# Patient Record
Sex: MALE | Race: WHITE | NOT HISPANIC OR LATINO
[De-identification: names, ages, dates, MRNs, and addresses within clinical notes are randomized per-mention and may not be internally consistent; named-entity substitution may affect disease eponyms.]

---

## 2019-02-22 ENCOUNTER — TRANSCRIPTION ENCOUNTER (OUTPATIENT)
Age: 84
End: 2019-02-22

## 2019-02-22 ENCOUNTER — INPATIENT (INPATIENT)
Facility: HOSPITAL | Age: 84
LOS: 12 days | Discharge: NURSING, REHAB OR LONG-TERM CARE | DRG: 480 | End: 2019-03-07
Attending: HOSPITALIST | Admitting: HOSPITALIST
Payer: MEDICARE

## 2019-02-22 VITALS
OXYGEN SATURATION: 93 % | TEMPERATURE: 98 F | HEART RATE: 90 BPM | SYSTOLIC BLOOD PRESSURE: 163 MMHG | RESPIRATION RATE: 16 BRPM | WEIGHT: 149.91 LBS | DIASTOLIC BLOOD PRESSURE: 68 MMHG | HEIGHT: 69 IN

## 2019-02-22 DIAGNOSIS — S72.001A FRACTURE OF UNSPECIFIED PART OF NECK OF RIGHT FEMUR, INITIAL ENCOUNTER FOR CLOSED FRACTURE: ICD-10-CM

## 2019-02-22 DIAGNOSIS — Z98.890 OTHER SPECIFIED POSTPROCEDURAL STATES: Chronic | ICD-10-CM

## 2019-02-22 DIAGNOSIS — Z29.9 ENCOUNTER FOR PROPHYLACTIC MEASURES, UNSPECIFIED: ICD-10-CM

## 2019-02-22 DIAGNOSIS — R94.31 ABNORMAL ELECTROCARDIOGRAM [ECG] [EKG]: ICD-10-CM

## 2019-02-22 DIAGNOSIS — I10 ESSENTIAL (PRIMARY) HYPERTENSION: ICD-10-CM

## 2019-02-22 DIAGNOSIS — E78.5 HYPERLIPIDEMIA, UNSPECIFIED: ICD-10-CM

## 2019-02-22 LAB
ALBUMIN SERPL ELPH-MCNC: 3.2 G/DL — LOW (ref 3.3–5)
ALP SERPL-CCNC: 103 U/L — SIGNIFICANT CHANGE UP (ref 30–120)
ALT FLD-CCNC: 26 U/L DA — SIGNIFICANT CHANGE UP (ref 10–60)
ANION GAP SERPL CALC-SCNC: 11 MMOL/L — SIGNIFICANT CHANGE UP (ref 5–17)
APTT BLD: 33.2 SEC — SIGNIFICANT CHANGE UP (ref 28.5–37)
AST SERPL-CCNC: 23 U/L — SIGNIFICANT CHANGE UP (ref 10–40)
BASOPHILS # BLD AUTO: 0.07 K/UL — SIGNIFICANT CHANGE UP (ref 0–0.2)
BASOPHILS NFR BLD AUTO: 0.6 % — SIGNIFICANT CHANGE UP (ref 0–2)
BILIRUB SERPL-MCNC: 0.6 MG/DL — SIGNIFICANT CHANGE UP (ref 0.2–1.2)
BUN SERPL-MCNC: 28 MG/DL — HIGH (ref 7–23)
CALCIUM SERPL-MCNC: 8.8 MG/DL — SIGNIFICANT CHANGE UP (ref 8.4–10.5)
CHLORIDE SERPL-SCNC: 102 MMOL/L — SIGNIFICANT CHANGE UP (ref 96–108)
CO2 SERPL-SCNC: 27 MMOL/L — SIGNIFICANT CHANGE UP (ref 22–31)
CREAT SERPL-MCNC: 0.91 MG/DL — SIGNIFICANT CHANGE UP (ref 0.5–1.3)
EOSINOPHIL # BLD AUTO: 0.05 K/UL — SIGNIFICANT CHANGE UP (ref 0–0.5)
EOSINOPHIL NFR BLD AUTO: 0.4 % — SIGNIFICANT CHANGE UP (ref 0–6)
GLUCOSE SERPL-MCNC: 138 MG/DL — HIGH (ref 70–99)
HCT VFR BLD CALC: 38.6 % — LOW (ref 39–50)
HGB BLD-MCNC: 11.8 G/DL — LOW (ref 13–17)
IMM GRANULOCYTES NFR BLD AUTO: 0.4 % — SIGNIFICANT CHANGE UP (ref 0–1.5)
INR BLD: 1.09 RATIO — SIGNIFICANT CHANGE UP (ref 0.88–1.16)
LYMPHOCYTES # BLD AUTO: 1.25 K/UL — SIGNIFICANT CHANGE UP (ref 1–3.3)
LYMPHOCYTES # BLD AUTO: 11.2 % — LOW (ref 13–44)
MCHC RBC-ENTMCNC: 26.8 PG — LOW (ref 27–34)
MCHC RBC-ENTMCNC: 30.6 GM/DL — LOW (ref 32–36)
MCV RBC AUTO: 87.7 FL — SIGNIFICANT CHANGE UP (ref 80–100)
MONOCYTES # BLD AUTO: 0.72 K/UL — SIGNIFICANT CHANGE UP (ref 0–0.9)
MONOCYTES NFR BLD AUTO: 6.4 % — SIGNIFICANT CHANGE UP (ref 2–14)
NEUTROPHILS # BLD AUTO: 9.04 K/UL — HIGH (ref 1.8–7.4)
NEUTROPHILS NFR BLD AUTO: 81 % — HIGH (ref 43–77)
NRBC # BLD: 0 /100 WBCS — SIGNIFICANT CHANGE UP (ref 0–0)
PLATELET # BLD AUTO: 157 K/UL — SIGNIFICANT CHANGE UP (ref 150–400)
POTASSIUM SERPL-MCNC: 3.5 MMOL/L — SIGNIFICANT CHANGE UP (ref 3.5–5.3)
POTASSIUM SERPL-SCNC: 3.5 MMOL/L — SIGNIFICANT CHANGE UP (ref 3.5–5.3)
PROT SERPL-MCNC: 6.8 G/DL — SIGNIFICANT CHANGE UP (ref 6–8.3)
PROTHROM AB SERPL-ACNC: 11.9 SEC — SIGNIFICANT CHANGE UP (ref 10–12.9)
RBC # BLD: 4.4 M/UL — SIGNIFICANT CHANGE UP (ref 4.2–5.8)
RBC # FLD: 15.6 % — HIGH (ref 10.3–14.5)
SODIUM SERPL-SCNC: 140 MMOL/L — SIGNIFICANT CHANGE UP (ref 135–145)
WBC # BLD: 11.17 K/UL — HIGH (ref 3.8–10.5)
WBC # FLD AUTO: 11.17 K/UL — HIGH (ref 3.8–10.5)

## 2019-02-22 PROCEDURE — 72125 CT NECK SPINE W/O DYE: CPT | Mod: 26

## 2019-02-22 PROCEDURE — 73502 X-RAY EXAM HIP UNI 2-3 VIEWS: CPT | Mod: 26,RT

## 2019-02-22 PROCEDURE — 73552 X-RAY EXAM OF FEMUR 2/>: CPT | Mod: 26,RT

## 2019-02-22 PROCEDURE — 72192 CT PELVIS W/O DYE: CPT | Mod: 26

## 2019-02-22 PROCEDURE — 99223 1ST HOSP IP/OBS HIGH 75: CPT | Mod: AI

## 2019-02-22 PROCEDURE — 99285 EMERGENCY DEPT VISIT HI MDM: CPT

## 2019-02-22 PROCEDURE — 93010 ELECTROCARDIOGRAM REPORT: CPT

## 2019-02-22 PROCEDURE — 70450 CT HEAD/BRAIN W/O DYE: CPT | Mod: 26

## 2019-02-22 PROCEDURE — 71045 X-RAY EXAM CHEST 1 VIEW: CPT | Mod: 26

## 2019-02-22 RX ORDER — MORPHINE SULFATE 50 MG/1
2 CAPSULE, EXTENDED RELEASE ORAL ONCE
Qty: 0 | Refills: 0 | Status: DISCONTINUED | OUTPATIENT
Start: 2019-02-22 | End: 2019-02-22

## 2019-02-22 RX ORDER — PENTOXIFYLLINE 400 MG
1 TABLET, EXTENDED RELEASE ORAL
Qty: 0 | Refills: 0 | COMMUNITY

## 2019-02-22 RX ORDER — SODIUM CHLORIDE 9 MG/ML
1000 INJECTION, SOLUTION INTRAVENOUS
Qty: 0 | Refills: 0 | Status: DISCONTINUED | OUTPATIENT
Start: 2019-02-22 | End: 2019-02-23

## 2019-02-22 RX ORDER — ATORVASTATIN CALCIUM 80 MG/1
1 TABLET, FILM COATED ORAL
Qty: 0 | Refills: 0 | COMMUNITY

## 2019-02-22 RX ORDER — TAMSULOSIN HYDROCHLORIDE 0.4 MG/1
0.4 CAPSULE ORAL AT BEDTIME
Qty: 0 | Refills: 0 | Status: DISCONTINUED | OUTPATIENT
Start: 2019-02-22 | End: 2019-02-23

## 2019-02-22 RX ORDER — HEPARIN SODIUM 5000 [USP'U]/ML
5000 INJECTION INTRAVENOUS; SUBCUTANEOUS ONCE
Qty: 0 | Refills: 0 | Status: COMPLETED | OUTPATIENT
Start: 2019-02-22 | End: 2019-02-23

## 2019-02-22 RX ORDER — PENTOXIFYLLINE 400 MG
400 TABLET, EXTENDED RELEASE ORAL THREE TIMES A DAY
Qty: 0 | Refills: 0 | Status: DISCONTINUED | OUTPATIENT
Start: 2019-02-22 | End: 2019-02-23

## 2019-02-22 RX ORDER — TAMSULOSIN HYDROCHLORIDE 0.4 MG/1
1 CAPSULE ORAL
Qty: 0 | Refills: 0 | COMMUNITY

## 2019-02-22 RX ORDER — ATORVASTATIN CALCIUM 80 MG/1
10 TABLET, FILM COATED ORAL AT BEDTIME
Qty: 0 | Refills: 0 | Status: DISCONTINUED | OUTPATIENT
Start: 2019-02-22 | End: 2019-02-23

## 2019-02-22 RX ADMIN — MORPHINE SULFATE 2 MILLIGRAM(S): 50 CAPSULE, EXTENDED RELEASE ORAL at 22:31

## 2019-02-22 RX ADMIN — MORPHINE SULFATE 2 MILLIGRAM(S): 50 CAPSULE, EXTENDED RELEASE ORAL at 23:53

## 2019-02-22 NOTE — H&P ADULT - NSHPREVIEWOFSYSTEMS_GEN_ALL_CORE
REVIEW OF SYSTEMS:  CONSTITUTIONAL:    +unintentional weight loss for the past months despite having a good appetite, no fevers  EYES:    no eye pain or visual disturbances or discharge  ENMT:     +hard of hearing, no vertigo, no sinus or throat pain  NECK:    no pain or stiffness  RESPIRATORY:    no cough or wheezing or chills or hemoptysis, no shortness of breath  CARDIOVASCULAR:    +bilateral LE edema, no chest pain or palpitations, no dizziness  GASTROINTESTINAL:    no abdominal or epigastric pain. no nausea or vomiting or hematemesis, no diarrhea, +intermittent constipation. no melena or hematochezia.  GENITOURINARY:    no dysuria or frequency or hematuria or incontinence  NEUROLOGICAL:    no headaches or memory loss or loss of strength or numbness or tremors  SKIN:    no itching or burning or rashes or lesions   LYMPH NODES:    no enlarged glands  ENDOCRINE:    no heat or cold intolerance, no hair loss, no polydipsia or polyuria  MUSCULOSKELETAL:    +knee pain, +hip pain, no muscle or back or extremity pain  PSYCHIATRIC:    no depression or anxiety or mood swings or difficulty sleeping  HEME/LYMPH:    no easy bruising or bleeding gums  ALLERGY AND IMMUNOLOGIC:    no hives or eczema

## 2019-02-22 NOTE — H&P ADULT - NSHPLABSRESULTS_GEN_ALL_CORE
LABS:                        11.8<L>  11.17<H> )-----------( 157      ( 22 Feb 2019 21:44 )             38.6<L>    140    |  102    |  28<H>  ----------------------------<  138<H>    22 Feb 2019 21:44  3.5     |  27     |  0.91     Ca 8.8           22 Feb 2019 21:44    TPro  6.8    /  Alb  3.2<L>  /  TBili  0.6    /  DBili  x      /  AST  23     /  ALT  26     /  AlkPhos  103    22 Feb 2019 21:44    PT/INR - ( 22 Feb 2019 21:44 )   PT: 11.9 ;   INR: 1.09        PTT - ( 22 Feb 2019 21:44 )  PTT:33.2     EKG:   NSR with 1st degree AV block (MI 212ms), prolonged QTc 490ms  Radiology:  < from: CT Pelvis Bony Only No Cont (02.22.19 @ 22:17) >    FINDINGS:    Bone: An acute, comminuted intertrochanteric right hip fracture is seen   with mild medial displacement of greater and lesser trochanteric fracture   fragments. No additional fracture or dislocation is demonstrated.   Moderate bilateral hip osteoarthritis is noted as evidenced by moderate   circumferential cartilage space narrowing and femoral head neck junction   osteophyte formation. Degenerative changes of the spine and sacroiliac   joints are seen.    Soft tissues: The distal right iliopsoas muscle is slightly enlarged with   adjacent inflammatory changelikely due to muscle strain. Mild   subcutaneous inflammatory change is seen in the posterolateral right hip.    Miscellaneous: Vascular calcifications are noted. Scattered sigmoid   diverticulosis is seen without evidence of diverticulitis. The prostate   is enlarged measuring up to 6.3 cm. The urinary bladder is unremarkable.   A moderate-sized fat containing left inguinal hernia is noted.    IMPRESSION:  Acute mildly displaced, comminuted intertrochanteric right hip fracture.    < end of copied text >

## 2019-02-22 NOTE — ED PROVIDER NOTE - PROGRESS NOTE DETAILS
pt resting comfortably in bed, pain improved after medication, spoke with ortho Dr Lopez who will take pt to OR tomorrow. will admit to hospitalist.

## 2019-02-22 NOTE — ED PROVIDER NOTE - OBJECTIVE STATEMENT
89 y/o male presents to ED c/o right hip pain s/p slip and fall x 2 hours ago. Rates pain 8/10, worse with movement, no radiation of pain, sudden onset. Pt states he takes daily aspirin 81 mg. Denies LOC. Denies head trauma. Denies any other complaints. States he otherwise feels good. Denies n/v, f/c, chest pain, sob, numbness, tingling, headache, lightheadedness, dizziness, visual changes, abdominal pain, back pain.

## 2019-02-22 NOTE — ED ADULT NURSE NOTE - PMH
Arthritis    Essential hypertension    Hyperlipidemia, unspecified hyperlipidemia type    Peripheral artery disease

## 2019-02-22 NOTE — H&P ADULT - FAMILY HISTORY
Family history of colon cancer, twin brother with colon CA     Mother  Still living? Unknown  Family history of MI (myocardial infarction), Age at diagnosis: Age Unknown

## 2019-02-22 NOTE — H&P ADULT - NSHPPHYSICALEXAM_GEN_ALL_CORE
PHYSICAL EXAM:  Vital Signs Last 24 Hrs  T(C): 36.8 (22 Feb 2019 20:39), Max: 36.8 (22 Feb 2019 20:39)  T(F): 98.3 (22 Feb 2019 20:39), Max: 98.3 (22 Feb 2019 20:39)  HR: 90 (22 Feb 2019 20:39) (90 - 90)  BP: 163/68 (22 Feb 2019 20:39) (163/68 - 163/68)  BP(mean): --  RR: 16 (22 Feb 2019 20:39) (16 - 16)  SpO2: 93% (22 Feb 2019 20:39) (93% - 93%)    GENERAL:     elderly male in NAD  HEAD:     atraumatic, normocephalic  EYES:     EOMI, conjunctiva and sclera clear  ENMT:     no tonsillar erythema or exudates or enlargement, no oral lesions, moist mucous membranes, good dentition  NECK:     supple, no JVD  RESPIRATORY:     clear to auscultation bilaterally, no rales or rhonchi or wheezing or rubs  CARDIOVASCULAR:     regular rate and rhythm, iii/vi harsh EDVIN LUSB,, 2+ peripheral pulses  GASTROINTESTINAL:     soft, nontender, nondistended, no hepatosplenomegaly palpated, bowel sounds present, +abdominal bruit heard  EXTREMITIES:     2+ bilateral lower extremity edema, no clubbing or cyanosis  MUSCULOSKELETAL:     +right hip pain, +right leg shortened and externally rotated  NERVOUS SYSTEM:     motor strength intact with 5/5 B/L upper and lower extremities, no gross sensory deficits  SKIN:     no rashes or lesions  PSYCH:     appropriate, alert and orientated x3, good concentration

## 2019-02-22 NOTE — H&P ADULT - HISTORY OF PRESENT ILLNESS
89M with HTN, HLD, peripheral artery disease, arthritis, who presents with right hip pain after a fall.  Patient was getting ready to eat his dinner when he slipped and landed on his right gluteus/hip.  Patient said he was wearing slippers that had no traction.  Denied any head trauma or lost of consciousness.  Denied any chest pain, SOB, palpitaitons right before fall.  Patient was brought here to the ED where he was found to have a right intertrochanteric fracture.  Prior to the fall, patient has been in his usual state of health.  Does complain of chronic arthritic knee pain, intermittent constipation, and recent swelling of BLE for the past 2 months.  Denied any recent fevers, cough, diarrhea.  Orthopedics was consulted and recommended surgery.

## 2019-02-22 NOTE — H&P ADULT - ASSESSMENT
89M with HTN, HLD, peripheral artery disease, arthritis, who presents with right hip pain after a fall.

## 2019-02-22 NOTE — ED ADULT NURSE NOTE - OBJECTIVE STATEMENT
Patient arrived via ambulance with c/o right leg and hip pain.  Patient admits to having sustained a fall.  States he heard a snap and a pop.  Right leg externally rotated.  Bilateral swelling noted in both lower extremities.  Weak pulses present on palpation.

## 2019-02-22 NOTE — H&P ADULT - PROBLEM SELECTOR PLAN 1
Regarding pre-op clearance, unfortunately, unable to assess physical capacity since patient says he has a hard time walking, going up stairs because of joint pain.  Patient tries to do an exercise bike daily but does not work up a sweat.  Although no cardiac symptoms, patient does not recall having any type of cardiac workup recently (does not see a cardiologist).  Has a murmur on exam, possibly AS.    - patient is NOT cleared for surgery yet  - will need to get a TTE to evaluate AS   - will also likely need cardiac clearance as well  - f/u ortho   - will keep patient NPO p MN still for possible surgery tomorrow pending above clerance  - pain control as needed  - PT consult

## 2019-02-22 NOTE — ED PROVIDER NOTE - CLINICAL SUMMARY MEDICAL DECISION MAKING FREE TEXT BOX
xray, ct head/cervical spine/ pelvis, pain management, labs, ortho consult xray, ct head/cervical spine/ pelvis, pain management, labs, ortho consult, admit, pt to OR tomorrow

## 2019-02-22 NOTE — ED PROVIDER NOTE - PHYSICAL EXAMINATION
Head- NC/AT. No oneil signs, no raccoon eyes, no hemotympanum, no contusions, no hematoma, no dental injuries.  Spine- no midline or paraspinal tenderness of cspine, thoracic spine or lumbar spine. No signs of back trauma, no masses, no abrasions, no lacerations, no redness, no bruising.  Neck- supple, no midline tenderness to palpation, + FROM, no abrasions, no ecchymosis.  Neuro- EOM intact, no nystagmus. Pelvis stable. Pt able to straight leg raise BL. NVI, good distal pulses x 4 extremities, capillary refill <2 sec x 4 extremities, sensation intact throughout, 5/5 motor x 4 extremities.  DTRs normal x 4 extremities.  Chest- no chest wall tenderness, equal expansion BL. No clavicular tenderness BL. No bruising, no deformity, no redness, no abrasions.  Extremities- No bony tenderness of upper or lower extremities BL except where noted. +tenderness right hip, right lower extremity shortened and externally rotated. FROM shoulders, elbows, wrists, hip left, knees, ankles. NVI, good distal pulses x 4 extremities, capillary refill <2 sec x 4 extremities, sensation intact throughout, 5/5 motor x 4 extremities. No calf tenderness BL. No bruising, no swelling, no redness, no deformity, no local signs of infection except where noted.

## 2019-02-22 NOTE — ED PROVIDER NOTE - CHPI ED SYMPTOMS NEG
no tingling/no weakness/no abrasion/no confusion/no fever/no bleeding/no loss of consciousness/no numbness/no vomiting

## 2019-02-22 NOTE — ED PROVIDER NOTE - ATTENDING CONTRIBUTION TO CARE
89 yo male BIBEMS s/p slip and fall tonight c/o right hip pain, 8/10, nonradiating, worse with movement.  No head injury no loc, no neck pain.  No other complaints    pt alert, awake, no acute distress, ncat, rrr, cta b/l, abd soft, nt, nd, no chest wall ttp, +right hip ttp, +shortened internally rotated right LE    Xrays show intertroch fx, CT head/neck negative, ortho consult, admit for surgery

## 2019-02-22 NOTE — H&P ADULT - NSHPSOCIALHISTORY_GEN_ALL_CORE
+ former heavy smoker (quit about 19 years ago, was a 2 ppk for 50 yrs)  + very rare etoh use (approx 1-2 beer/month)  - denies any illegal drug use  - lives alone

## 2019-02-22 NOTE — H&P ADULT - PROBLEM SELECTOR PLAN 5
IMPROVE VTE Individual Risk Assessment          RISK                                                          Points  [  ] Previous VTE                                                 3  [  ] Thrombophilia                                              2  [  ] Lower limb paralysis                                    2        (unable to hold up >15 seconds)    [  ] Current Cancer                                             2         (within 6 months)  [  ] Immobilization > 24 hrs                              1  [  ] ICU/CCU stay > 24 hours                            1  [X] Age > 60                                                        1    IMPROVE VTE Score 1    - will give heparin x1 tonight then hold for possible surgery

## 2019-02-22 NOTE — ED ADULT NURSE NOTE - INTERVENTIONS DEFINITIONS
[place] to call system/Instruct patient to call for assistance/Physically safe environment: no spills, clutter or unnecessary equipment/Provide visual clues: red socks

## 2019-02-23 DIAGNOSIS — Z01.810 ENCOUNTER FOR PREPROCEDURAL CARDIOVASCULAR EXAMINATION: ICD-10-CM

## 2019-02-23 DIAGNOSIS — R94.31 ABNORMAL ELECTROCARDIOGRAM [ECG] [EKG]: ICD-10-CM

## 2019-02-23 LAB
ALBUMIN SERPL ELPH-MCNC: 2.6 G/DL — LOW (ref 3.3–5)
ALP SERPL-CCNC: 81 U/L — SIGNIFICANT CHANGE UP (ref 30–120)
ALT FLD-CCNC: 21 U/L DA — SIGNIFICANT CHANGE UP (ref 10–60)
ANION GAP SERPL CALC-SCNC: 10 MMOL/L — SIGNIFICANT CHANGE UP (ref 5–17)
APTT BLD: 32.4 SEC — SIGNIFICANT CHANGE UP (ref 28.5–37)
AST SERPL-CCNC: 20 U/L — SIGNIFICANT CHANGE UP (ref 10–40)
BASOPHILS # BLD AUTO: 0.03 K/UL — SIGNIFICANT CHANGE UP (ref 0–0.2)
BASOPHILS NFR BLD AUTO: 0.3 % — SIGNIFICANT CHANGE UP (ref 0–2)
BILIRUB SERPL-MCNC: 0.6 MG/DL — SIGNIFICANT CHANGE UP (ref 0.2–1.2)
BUN SERPL-MCNC: 33 MG/DL — HIGH (ref 7–23)
CALCIUM SERPL-MCNC: 8.1 MG/DL — LOW (ref 8.4–10.5)
CHLORIDE SERPL-SCNC: 104 MMOL/L — SIGNIFICANT CHANGE UP (ref 96–108)
CO2 SERPL-SCNC: 25 MMOL/L — SIGNIFICANT CHANGE UP (ref 22–31)
CREAT SERPL-MCNC: 0.98 MG/DL — SIGNIFICANT CHANGE UP (ref 0.5–1.3)
EOSINOPHIL # BLD AUTO: 0 K/UL — SIGNIFICANT CHANGE UP (ref 0–0.5)
EOSINOPHIL NFR BLD AUTO: 0 % — SIGNIFICANT CHANGE UP (ref 0–6)
GLUCOSE SERPL-MCNC: 185 MG/DL — HIGH (ref 70–99)
HCT VFR BLD CALC: 33.5 % — LOW (ref 39–50)
HGB BLD-MCNC: 10.2 G/DL — LOW (ref 13–17)
IMM GRANULOCYTES NFR BLD AUTO: 0.5 % — SIGNIFICANT CHANGE UP (ref 0–1.5)
INR BLD: 1.16 RATIO — SIGNIFICANT CHANGE UP (ref 0.88–1.16)
LYMPHOCYTES # BLD AUTO: 0.74 K/UL — LOW (ref 1–3.3)
LYMPHOCYTES # BLD AUTO: 6.6 % — LOW (ref 13–44)
MAGNESIUM SERPL-MCNC: 1.9 MG/DL — SIGNIFICANT CHANGE UP (ref 1.6–2.6)
MCHC RBC-ENTMCNC: 26.1 PG — LOW (ref 27–34)
MCHC RBC-ENTMCNC: 30.4 GM/DL — LOW (ref 32–36)
MCV RBC AUTO: 85.7 FL — SIGNIFICANT CHANGE UP (ref 80–100)
MONOCYTES # BLD AUTO: 0.82 K/UL — SIGNIFICANT CHANGE UP (ref 0–0.9)
MONOCYTES NFR BLD AUTO: 7.3 % — SIGNIFICANT CHANGE UP (ref 2–14)
NEUTROPHILS # BLD AUTO: 9.54 K/UL — HIGH (ref 1.8–7.4)
NEUTROPHILS NFR BLD AUTO: 85.3 % — HIGH (ref 43–77)
NRBC # BLD: 0 /100 WBCS — SIGNIFICANT CHANGE UP (ref 0–0)
PHOSPHATE SERPL-MCNC: 3.5 MG/DL — SIGNIFICANT CHANGE UP (ref 2.5–4.5)
PLATELET # BLD AUTO: 176 K/UL — SIGNIFICANT CHANGE UP (ref 150–400)
POTASSIUM SERPL-MCNC: 3.5 MMOL/L — SIGNIFICANT CHANGE UP (ref 3.5–5.3)
POTASSIUM SERPL-SCNC: 3.5 MMOL/L — SIGNIFICANT CHANGE UP (ref 3.5–5.3)
PROT SERPL-MCNC: 5.6 G/DL — LOW (ref 6–8.3)
PROTHROM AB SERPL-ACNC: 12.7 SEC — SIGNIFICANT CHANGE UP (ref 10–12.9)
RBC # BLD: 3.91 M/UL — LOW (ref 4.2–5.8)
RBC # FLD: 15.9 % — HIGH (ref 10.3–14.5)
SODIUM SERPL-SCNC: 139 MMOL/L — SIGNIFICANT CHANGE UP (ref 135–145)
WBC # BLD: 11.19 K/UL — HIGH (ref 3.8–10.5)
WBC # FLD AUTO: 11.19 K/UL — HIGH (ref 3.8–10.5)

## 2019-02-23 PROCEDURE — 27245 TREAT THIGH FRACTURE: CPT | Mod: RT

## 2019-02-23 PROCEDURE — 93306 TTE W/DOPPLER COMPLETE: CPT | Mod: 26

## 2019-02-23 PROCEDURE — 99232 SBSQ HOSP IP/OBS MODERATE 35: CPT

## 2019-02-23 PROCEDURE — 76775 US EXAM ABDO BACK WALL LIM: CPT | Mod: 26

## 2019-02-23 PROCEDURE — 99223 1ST HOSP IP/OBS HIGH 75: CPT | Mod: 25

## 2019-02-23 PROCEDURE — 76770 US EXAM ABDO BACK WALL COMP: CPT | Mod: 26

## 2019-02-23 RX ORDER — CEFAZOLIN SODIUM 1 G
2000 VIAL (EA) INJECTION ONCE
Qty: 0 | Refills: 0 | Status: DISCONTINUED | OUTPATIENT
Start: 2019-02-23 | End: 2019-02-23

## 2019-02-23 RX ORDER — TAMSULOSIN HYDROCHLORIDE 0.4 MG/1
0.4 CAPSULE ORAL AT BEDTIME
Qty: 0 | Refills: 0 | Status: DISCONTINUED | OUTPATIENT
Start: 2019-02-23 | End: 2019-03-07

## 2019-02-23 RX ORDER — SODIUM CHLORIDE 9 MG/ML
1000 INJECTION, SOLUTION INTRAVENOUS
Qty: 0 | Refills: 0 | Status: DISCONTINUED | OUTPATIENT
Start: 2019-02-23 | End: 2019-02-23

## 2019-02-23 RX ORDER — HYDROMORPHONE HYDROCHLORIDE 2 MG/ML
0.25 INJECTION INTRAMUSCULAR; INTRAVENOUS; SUBCUTANEOUS
Qty: 0 | Refills: 0 | Status: DISCONTINUED | OUTPATIENT
Start: 2019-02-23 | End: 2019-02-23

## 2019-02-23 RX ORDER — CEFAZOLIN SODIUM 1 G
2000 VIAL (EA) INJECTION EVERY 8 HOURS
Qty: 0 | Refills: 0 | Status: COMPLETED | OUTPATIENT
Start: 2019-02-23 | End: 2019-02-24

## 2019-02-23 RX ORDER — PENTOXIFYLLINE 400 MG
400 TABLET, EXTENDED RELEASE ORAL THREE TIMES A DAY
Qty: 0 | Refills: 0 | Status: DISCONTINUED | OUTPATIENT
Start: 2019-02-23 | End: 2019-03-07

## 2019-02-23 RX ORDER — ACETAMINOPHEN 500 MG
1000 TABLET ORAL ONCE
Qty: 0 | Refills: 0 | Status: COMPLETED | OUTPATIENT
Start: 2019-02-23 | End: 2019-02-23

## 2019-02-23 RX ORDER — ENOXAPARIN SODIUM 100 MG/ML
40 INJECTION SUBCUTANEOUS DAILY
Qty: 0 | Refills: 0 | Status: DISCONTINUED | OUTPATIENT
Start: 2019-02-24 | End: 2019-03-07

## 2019-02-23 RX ORDER — OXYCODONE HYDROCHLORIDE 5 MG/1
5 TABLET ORAL
Qty: 0 | Refills: 0 | Status: DISCONTINUED | OUTPATIENT
Start: 2019-02-23 | End: 2019-02-28

## 2019-02-23 RX ORDER — MORPHINE SULFATE 50 MG/1
4 CAPSULE, EXTENDED RELEASE ORAL EVERY 4 HOURS
Qty: 0 | Refills: 0 | Status: DISCONTINUED | OUTPATIENT
Start: 2019-02-23 | End: 2019-02-23

## 2019-02-23 RX ORDER — OXYCODONE HYDROCHLORIDE 5 MG/1
10 TABLET ORAL
Qty: 0 | Refills: 0 | Status: DISCONTINUED | OUTPATIENT
Start: 2019-02-23 | End: 2019-02-25

## 2019-02-23 RX ORDER — SODIUM CHLORIDE 9 MG/ML
1000 INJECTION, SOLUTION INTRAVENOUS
Qty: 0 | Refills: 0 | Status: DISCONTINUED | OUTPATIENT
Start: 2019-02-23 | End: 2019-02-24

## 2019-02-23 RX ORDER — ATORVASTATIN CALCIUM 80 MG/1
10 TABLET, FILM COATED ORAL AT BEDTIME
Qty: 0 | Refills: 0 | Status: DISCONTINUED | OUTPATIENT
Start: 2019-02-23 | End: 2019-03-07

## 2019-02-23 RX ADMIN — TAMSULOSIN HYDROCHLORIDE 0.4 MILLIGRAM(S): 0.4 CAPSULE ORAL at 21:16

## 2019-02-23 RX ADMIN — HEPARIN SODIUM 5000 UNIT(S): 5000 INJECTION INTRAVENOUS; SUBCUTANEOUS at 00:00

## 2019-02-23 RX ADMIN — ATORVASTATIN CALCIUM 10 MILLIGRAM(S): 80 TABLET, FILM COATED ORAL at 21:16

## 2019-02-23 RX ADMIN — Medication 1000 MILLIGRAM(S): at 22:18

## 2019-02-23 RX ADMIN — Medication 400 MILLIGRAM(S): at 21:17

## 2019-02-23 RX ADMIN — SODIUM CHLORIDE 100 MILLILITER(S): 9 INJECTION, SOLUTION INTRAVENOUS at 14:17

## 2019-02-23 RX ADMIN — OXYCODONE HYDROCHLORIDE 10 MILLIGRAM(S): 5 TABLET ORAL at 22:28

## 2019-02-23 RX ADMIN — Medication 100 MILLIGRAM(S): at 20:30

## 2019-02-23 RX ADMIN — OXYCODONE HYDROCHLORIDE 10 MILLIGRAM(S): 5 TABLET ORAL at 22:12

## 2019-02-23 RX ADMIN — MORPHINE SULFATE 4 MILLIGRAM(S): 50 CAPSULE, EXTENDED RELEASE ORAL at 02:14

## 2019-02-23 NOTE — PROGRESS NOTE ADULT - SUBJECTIVE AND OBJECTIVE BOX
Subjective: Pain under control.  No complaints at this time.     MEDICATIONS  (STANDING):  atorvastatin 10 milliGRAM(s) Oral at bedtime  ceFAZolin   IVPB 2000 milliGRAM(s) IV Intermittent once  lactated ringers. 1000 milliLiter(s) (100 mL/Hr) IV Continuous <Continuous>  pentoxifylline 400 milliGRAM(s) Oral three times a day  tamsulosin 0.4 milliGRAM(s) Oral at bedtime    MEDICATIONS  (PRN):  morphine  - Injectable 4 milliGRAM(s) IV Push every 4 hours PRN Moderate Pain (4 - 6)      Allergies    No Known Allergies    Intolerances    Vital Signs Last 24 Hrs  T(C): 36.6 (23 Feb 2019 07:03), Max: 36.8 (22 Feb 2019 20:39)  T(F): 97.8 (23 Feb 2019 07:03), Max: 98.3 (22 Feb 2019 20:39)  HR: 90 (23 Feb 2019 07:03) (90 - 96)  BP: 111/56 (23 Feb 2019 07:03) (109/51 - 163/68)  BP(mean): --  RR: 20 (23 Feb 2019 07:03) (16 - 20)  SpO2: 92% (23 Feb 2019 07:03) (92% - 95%)    PHYSICAL EXAM:  GENERAL: NAD, well-groomed, well-developed  HEAD:  Atraumatic, Normocephalic  EYES: EOMI, PERRLA, conjunctiva and sclera clear  ENMT: Moist mucous membranes,  NECK: Supple, No JVD, Normal thyroid  NERVOUS SYSTEM:  Good concentration; All 4 extremities mobile, no gross sensory deficits.   CHEST/LUNG: Clear to auscultation bilaterally; No rales, rhonchi, wheezing, or rubs  HEART: Regular rate and rhythm; Grade 2 Systolic Murmur at 2nd IC space  ABDOMEN: Soft, Nontender, Nondistended; Bowel sounds present  EXTREMITIES:  2+ Peripheral Pulses, No clubbing, cyanosis, or edema  LYMPH: No lymphadenopathy noted  SKIN: No rashes or lesions    LABS:                        10.2   11.19 )-----------( 176      ( 23 Feb 2019 07:31 )             33.5     23 Feb 2019 07:31    139    |  104    |  33     ----------------------------<  185    3.5     |  25     |  0.98     Ca    8.1        23 Feb 2019 07:31  Phos  3.5       23 Feb 2019 07:31  Mg     1.9       23 Feb 2019 07:31    TPro  5.6    /  Alb  2.6    /  TBili  0.6    /  DBili  x      /  AST  20     /  ALT  21     /  AlkPhos  81     23 Feb 2019 07:31    PT/INR - ( 23 Feb 2019 07:31 )   PT: 12.7 sec;   INR: 1.16 ratio         PTT - ( 23 Feb 2019 07:31 )  PTT:32.4 sec    CAPILLARY BLOOD GLUCOSE          RADIOLOGY & ADDITIONAL TESTS:    Imaging Personally Reviewed:  [ ] YES     Consultant(s) Notes Reviewed:      Care Discussed with Consultants/Other Providers:    Advanced Directives: [ ] DNR  [ ] No feeding tube  [ ] MOLST in chart  [ ] MOLST completed today  [ ] Unknown

## 2019-02-23 NOTE — BRIEF OPERATIVE NOTE - PROCEDURE
<<-----Click on this checkbox to enter Procedure Open reduction and internal fixation (ORIF) of hip  02/23/2019    Active  Colt Smith

## 2019-02-23 NOTE — CONSULT NOTE ADULT - SUBJECTIVE AND OBJECTIVE BOX
CHIEF COMPLAINT: Patient is a 89y old  Male who presents with a chief complaint of right hip pain after a fall (22 Feb 2019 23:31)    HPI:  89 year old man with a history of HTN, HLD, peripheral artery disease, and arthritis, who presented to the ER with right hip pain after a fall -- he says his "foot slipped" and he fell to the ground; there was no associated loss of consciousness.  He was found to have a right intertrochanteric fracture and surgical repair is anticipated.  He has no history of heart disease; he has not been experiencing angina, dyspnea, or palpitations.  Due to a heart murmur, echocardiography was ordered -- reveals moderate aortic and mitral stenosis.  He describes a fair functional status -- says that he walks independently but uses a walker when ambulating "long distances."    PAST MEDICAL & SURGICAL HISTORY:  Peripheral artery disease  Arthritis  Hyperlipidemia, unspecified hyperlipidemia type  Essential hypertension  History of back surgery: not spinal surgery, possibly lipoma?  H/O hernia repair    SOCIAL HISTORY:   Alcohol: Denied  Smoking: Nonsmoker    FAMILY HISTORY:  Family history of colon cancer: twin brother with colon CA  Family history of MI (myocardial infarction) (Mother): at age 72    Home Medications:  hydroCHLOROthiazide 25 mg oral tablet: 1 tab(s) orally once a day (22 Feb 2019 23:25)  Lipitor 10 mg oral tablet: 1 tab(s) orally once a day (22 Feb 2019 23:25)  Prinivil 40 mg oral tablet: 1 tab(s) orally once a day (22 Feb 2019 23:25)  tamsulosin 0.4 mg oral capsule: 1 cap(s) orally once a day (22 Feb 2019 23:25)  TRENtal 400 mg oral tablet, extended release: 1 tab(s) orally 3 times a day (22 Feb 2019 23:25)    MEDICATIONS  (STANDING):  atorvastatin 10 milliGRAM(s) Oral at bedtime  lactated ringers. 1000 milliLiter(s) (100 mL/Hr) IV Continuous <Continuous>  pentoxifylline 400 milliGRAM(s) Oral three times a day  tamsulosin 0.4 milliGRAM(s) Oral at bedtime    MEDICATIONS  (PRN):  morphine  - Injectable 4 milliGRAM(s) IV Push every 4 hours PRN Moderate Pain (4 - 6)    Allergies:  No Known Allergies    REVIEW OF SYSTEMS:  CONSTITUTIONAL: No fevers or chills  Eyes: No visual changes  NECK: No pain or stiffness  RESPIRATORY: No cough, wheezing, hemoptysis; No shortness of breath  CARDIOVASCULAR: No chest pain or palpitations  GASTROINTESTINAL: No abdominal pain. No nausea, vomiting..  GENITOURINARY: No dysuria  NEUROLOGICAL: No numbness.  SKIN: No itching or rash  All other review of systems is negative unless indicated above    VITAL SIGNS:   Vital Signs Last 24 Hrs  T(C): 36.6 (23 Feb 2019 07:03), Max: 36.8 (22 Feb 2019 20:39)  T(F): 97.8 (23 Feb 2019 07:03), Max: 98.3 (22 Feb 2019 20:39)  HR: 90 (23 Feb 2019 07:03) (90 - 96)  BP: 111/56 (23 Feb 2019 07:03) (109/51 - 163/68)  RR: 20 (23 Feb 2019 07:03) (16 - 20)  SpO2: 92% (23 Feb 2019 07:03) (92% - 95%)    PHYSICAL EXAM:  Constitutional: NAD, appears stated age, forgetful (briefly forgot where he was and thinks he already had surgery -- redirectable)  HEENT:  EOMI,  Pupils round, No oral cyanosis.  Pulmonary: Non-labored, breath sounds are clear bilaterally, No wheezing, rales or rhonchi  Cardiovascular: S1 and S2, regular rate and rhythm, no Murmurs, gallops or rubs  Gastrointestinal: Bowel Sounds present, soft, nontender.   Lymph: No peripheral edema. No cervical lymphadenopathy.  Neurological: Alert, no focal deficits  Skin: No rashes.  Psych:  Mood & affect appropriate    LABS:                   10.2   11.19 )-----------( 176      ( 23 Feb 2019 07:31 )             33.5              139    |  104    |  33     ----------------------------<  185    3.5     |  25     |  0.98     ECG (2/22 @ 23:41): Normal sinus rhythm with 1st degree AV block (), mildly prolonged QTc interval    Transthoracic Echocardiogram w/Doppler Complete (02.23.19 @ 08:38) >  1. Technically very limited study with poor acoustic windows.  2. The left ventricle was seen in a very limited fashion in the subcostal window only.  Grossly normal left ventricular size and overall systolic function. LVEF estimated at 60% (visual assessment).  Mild diastolic dysfunction (stage I).  3. Moderate mitral stenosis.  4. Moderate aortic stenosis.    Xray Femur 2 Views, Right (02.22.19 @ 23:07): Comminuted mildly displaced right intertrochanteric fracture.    Xray Chest 1 View- PORTABLE-Routine (02.22.19 @ 23:04):  Normal heart size. Increased interstitial markings. No consolidation. No pleural effusion or pneumothorax.

## 2019-02-23 NOTE — PROGRESS NOTE ADULT - ASSESSMENT
89M HTN, HLD, PAD admitted for Right IT Fracture secondary to mechanical Fall.     Right IT Fracture   Related to mechanical Fall.  TTE reveals moderate AS. Patient is medically optimized at this point to proceed to surgery. Cardiology evaluation obtained as well. Pain control with IV Morphine as needed. Ortho planning for surgery later today. Monitor Hgb and electrolytes.     HTN  Controlled but holding Lisinopril and HCTZ.     HLD   Atorvastatin    BPH  Flomax    Diet  NPO    DVT Prophylaxis  Heparin 5000 SC TID     Disposition  Full Code/Inpatient  PT/OT Eval post operative

## 2019-02-23 NOTE — CONSULT NOTE ADULT - PROBLEM SELECTOR RECOMMENDATION 9
Mr. Fairchild has several clinical predictors of perioperative cardiac complications (advanced age, history of PAD [details unknown], and mildly abnormal ECG); however, functional status is fair and there is no history of ischemic heart disease.  Echo revealed normal LV systolic function with moderate mitral/aortic stenosis.  he appears optimized from a cardiac standpoint for necessary surgery.

## 2019-02-24 LAB
ANION GAP SERPL CALC-SCNC: 9 MMOL/L — SIGNIFICANT CHANGE UP (ref 5–17)
BUN SERPL-MCNC: 26 MG/DL — HIGH (ref 7–23)
CALCIUM SERPL-MCNC: 8.2 MG/DL — LOW (ref 8.4–10.5)
CHLORIDE SERPL-SCNC: 105 MMOL/L — SIGNIFICANT CHANGE UP (ref 96–108)
CO2 SERPL-SCNC: 27 MMOL/L — SIGNIFICANT CHANGE UP (ref 22–31)
CREAT SERPL-MCNC: 0.89 MG/DL — SIGNIFICANT CHANGE UP (ref 0.5–1.3)
GLUCOSE SERPL-MCNC: 131 MG/DL — HIGH (ref 70–99)
HCT VFR BLD CALC: 32.3 % — LOW (ref 39–50)
HGB BLD-MCNC: 10.1 G/DL — LOW (ref 13–17)
MAGNESIUM SERPL-MCNC: 2 MG/DL — SIGNIFICANT CHANGE UP (ref 1.6–2.6)
MCHC RBC-ENTMCNC: 27.5 PG — SIGNIFICANT CHANGE UP (ref 27–34)
MCHC RBC-ENTMCNC: 31.3 GM/DL — LOW (ref 32–36)
MCV RBC AUTO: 88 FL — SIGNIFICANT CHANGE UP (ref 80–100)
NRBC # BLD: 0 /100 WBCS — SIGNIFICANT CHANGE UP (ref 0–0)
PLATELET # BLD AUTO: 161 K/UL — SIGNIFICANT CHANGE UP (ref 150–400)
POTASSIUM SERPL-MCNC: 4 MMOL/L — SIGNIFICANT CHANGE UP (ref 3.5–5.3)
POTASSIUM SERPL-SCNC: 4 MMOL/L — SIGNIFICANT CHANGE UP (ref 3.5–5.3)
RBC # BLD: 3.67 M/UL — LOW (ref 4.2–5.8)
RBC # FLD: 15.9 % — HIGH (ref 10.3–14.5)
SODIUM SERPL-SCNC: 141 MMOL/L — SIGNIFICANT CHANGE UP (ref 135–145)
WBC # BLD: 14.36 K/UL — HIGH (ref 3.8–10.5)
WBC # FLD AUTO: 14.36 K/UL — HIGH (ref 3.8–10.5)

## 2019-02-24 PROCEDURE — 99232 SBSQ HOSP IP/OBS MODERATE 35: CPT

## 2019-02-24 RX ADMIN — OXYCODONE HYDROCHLORIDE 10 MILLIGRAM(S): 5 TABLET ORAL at 19:10

## 2019-02-24 RX ADMIN — TAMSULOSIN HYDROCHLORIDE 0.4 MILLIGRAM(S): 0.4 CAPSULE ORAL at 22:18

## 2019-02-24 RX ADMIN — OXYCODONE HYDROCHLORIDE 10 MILLIGRAM(S): 5 TABLET ORAL at 05:57

## 2019-02-24 RX ADMIN — ENOXAPARIN SODIUM 40 MILLIGRAM(S): 100 INJECTION SUBCUTANEOUS at 08:47

## 2019-02-24 RX ADMIN — OXYCODONE HYDROCHLORIDE 10 MILLIGRAM(S): 5 TABLET ORAL at 13:25

## 2019-02-24 RX ADMIN — OXYCODONE HYDROCHLORIDE 10 MILLIGRAM(S): 5 TABLET ORAL at 19:11

## 2019-02-24 RX ADMIN — OXYCODONE HYDROCHLORIDE 10 MILLIGRAM(S): 5 TABLET ORAL at 06:30

## 2019-02-24 RX ADMIN — ATORVASTATIN CALCIUM 10 MILLIGRAM(S): 80 TABLET, FILM COATED ORAL at 22:18

## 2019-02-24 RX ADMIN — Medication 100 MILLIGRAM(S): at 04:28

## 2019-02-24 NOTE — PROGRESS NOTE ADULT - SUBJECTIVE AND OBJECTIVE BOX
REASON FOR VISIT:  Patient seen 2/23 for pre-op cardiac examination; follow-up    HPI:  89 year old man with a history of HTN, HLD, moderate aortic and mitral stenoses, peripheral artery disease, and arthritis admitted 2/22 with hip fracture s/p fall and now POD # 1 ORIF right hip.    2/24/19:  Mild nausea; denies: dyspnea, angina, palpitations.    MEDICATIONS  (STANDING):  atorvastatin 10 milliGRAM(s) Oral at bedtime  enoxaparin Injectable 40 milliGRAM(s) SubCutaneous daily  lactated ringers. 1000 milliLiter(s) (100 mL/Hr) IV Continuous <Continuous>  pentoxifylline 400 milliGRAM(s) Oral three times a day  tamsulosin 0.4 milliGRAM(s) Oral at bedtime    MEDICATIONS  (PRN):  morphine  - Injectable 4 milliGRAM(s) IV Push every 4 hours PRN Moderate Pain (4 - 6)  oxyCODONE    IR 5 milliGRAM(s) Oral every 3 hours PRN Mild Pain (1 - 3)  oxyCODONE    IR 10 milliGRAM(s) Oral every 3 hours PRN Moderate Pain (4 - 6)    Vital Signs Last 24 Hrs  T(C): 36.9 (24 Feb 2019 03:25), Max: 36.9 (24 Feb 2019 03:25)  T(F): 98.4 (24 Feb 2019 03:25), Max: 98.4 (24 Feb 2019 03:25)  HR: 100 (24 Feb 2019 03:25) (72 - 106)  BP: 110/54 (24 Feb 2019 03:25) (110/54 - 130/41)  RR: 17 (24 Feb 2019 03:25) (12 - 22)  SpO2: 91% (24 Feb 2019 03:25) (91% - 100%)    PHYSICAL EXAM:  Constitutional: NAD, appears stated age, semirecumbent in bed, asleep, easily awakened  Pulmonary: Non-labored, breath sounds are clear bilaterally, No wheezing or rales   Cardiovascular: S1 and S2, regular rate and rhythm  Gastrointestinal: Bowel Sounds present, soft, nontender.   Psych:  Mood & affect appropriate    LABS:                          10.1   14.36 )-----------( 161      ( 24 Feb 2019 06:59 )             32.3     141  |  105  |  26<H>  ----------------------------<  131<H>  4.0   |  27  |  0.89    Ca    8.2<L>      24 Feb 2019 06:59  Phos  3.5     02-23  Mg     2.0     02-24    TPro  5.6<L>  /  Alb  2.6<L>  /  TBili  0.6  /  DBili  x   /  AST  20  /  ALT  21  /  AlkPhos  81  02-23    ECG (2/22 @ 23:41): Normal sinus rhythm with 1st degree AV block (), mildly prolonged QTc interval    Transthoracic Echocardiogram w/Doppler Complete (02.23.19 @ 08:38) >  1. Technically very limited study with poor acoustic windows.  2. The left ventricle was seen in a very limited fashion in the subcostal window only.  Grossly normal left ventricular size and overall systolic function. LVEF estimated at 60% (visual assessment).  Mild diastolic dysfunction (stage I).  3. Moderate mitral stenosis.  4. Moderate aortic stenosis.    Xray Femur 2 Views, Right (02.22.19 @ 23:07): Comminuted mildly displaced right intertrochanteric fracture.    Xray Chest 1 View- PORTABLE-Routine (02.22.19 @ 23:04):  Normal heart size. Increased interstitial markings. No consolidation. No pleural effusion or pneumothorax.

## 2019-02-24 NOTE — PROGRESS NOTE ADULT - PROBLEM SELECTOR PLAN 3
Occasional PVCs, ventricular couplets, several beats WCT (polymorphic) on telemetry; asymptomatic; LV function is grossly normal; Potassium = 4; I recommend continued telemetry monitoring; low-dose metoprolol if ectopy increases.

## 2019-02-24 NOTE — PROGRESS NOTE ADULT - SUBJECTIVE AND OBJECTIVE BOX
Subjective: Pain in Right Hip    MEDICATIONS  (STANDING):  atorvastatin 10 milliGRAM(s) Oral at bedtime  enoxaparin Injectable 40 milliGRAM(s) SubCutaneous daily  lactated ringers. 1000 milliLiter(s) (100 mL/Hr) IV Continuous <Continuous>  pentoxifylline 400 milliGRAM(s) Oral three times a day  tamsulosin 0.4 milliGRAM(s) Oral at bedtime    MEDICATIONS  (PRN):  morphine  - Injectable 4 milliGRAM(s) IV Push every 4 hours PRN Moderate Pain (4 - 6)  oxyCODONE    IR 5 milliGRAM(s) Oral every 3 hours PRN Mild Pain (1 - 3)  oxyCODONE    IR 10 milliGRAM(s) Oral every 3 hours PRN Moderate Pain (4 - 6)      Allergies    No Known Allergies    Intolerances        Vital Signs Last 24 Hrs  T(C): 36.5 (24 Feb 2019 07:05), Max: 36.9 (24 Feb 2019 03:25)  T(F): 97.7 (24 Feb 2019 07:05), Max: 98.4 (24 Feb 2019 03:25)  HR: 96 (24 Feb 2019 07:05) (72 - 106)  BP: 143/62 (24 Feb 2019 07:05) (110/54 - 143/62)  BP(mean): --  RR: 19 (24 Feb 2019 07:05) (17 - 19)  SpO2: 98% (24 Feb 2019 07:05) (91% - 98%)    PHYSICAL EXAM:  GENERAL: NAD, well-groomed, well-developed  HEAD:  Atraumatic, Normocephalic  EYES: EOMI, PERRLA, conjunctiva and sclera clear  NECK: Supple, No JVD, Normal thyroid  NERVOUS SYSTEM:  Alert & Oriented X3, Good concentration; All 4 extremities mobile, no gross sensory deficits.   CHEST/LUNG: Clear to auscultation bilaterally; No rales, rhonchi, wheezing, or rubs  HEART: Regular rate and rhythm; Grade 2 Systolic Murmer, rubs, or gallops  ABDOMEN: Soft, Nontender, Nondistended; Bowel sounds present  EXTREMITIES:  2+ Peripheral Pulses, No clubbing, cyanosis, or edema      LABS:                        10.1   14.36 )-----------( 161      ( 24 Feb 2019 06:59 )             32.3     24 Feb 2019 06:59    141    |  105    |  26     ----------------------------<  131    4.0     |  27     |  0.89     Ca    8.2        24 Feb 2019 06:59  Mg     2.0       24 Feb 2019 06:59      PT/INR - ( 23 Feb 2019 07:31 )   PT: 12.7 sec;   INR: 1.16 ratio         PTT - ( 23 Feb 2019 07:31 )  PTT:32.4 sec    CAPILLARY BLOOD GLUCOSE          RADIOLOGY & ADDITIONAL TESTS:    Imaging Personally Reviewed:  [ ] YES     Consultant(s) Notes Reviewed:      Care Discussed with Consultants/Other Providers:    Advanced Directives: [ ] DNR  [ ] No feeding tube  [ ] MOLST in chart  [ ] MOLST completed today  [ ] Unknown

## 2019-02-24 NOTE — PROGRESS NOTE ADULT - SUBJECTIVE AND OBJECTIVE BOX
POST OPERATIVE DAY #: 1    89y Male  s/p  Right hip ORIF                SUBJECTIVE: Patient seen and examined at bedside.     Pain:  well controlled    Pain scale:   3/10  Denies CP, SOB, N/V/D, weakness, numbness   No new complains     OBJECTIVE:     Vital Signs Last 24 Hrs  T(C): 36.5 (24 Feb 2019 07:05), Max: 36.9 (24 Feb 2019 03:25)  T(F): 97.7 (24 Feb 2019 07:05), Max: 98.4 (24 Feb 2019 03:25)  HR: 96 (24 Feb 2019 07:05) (72 - 106)  BP: 143/62 (24 Feb 2019 07:05) (110/54 - 143/62)  BP(mean): --  RR: 19 (24 Feb 2019 07:05) (12 - 22)  SpO2: 98% (24 Feb 2019 07:05) (91% - 100%)    Affected extremity: RLE         Dressing: clean/dry/intact                           Sensation: intact to light touch          Motor exam: 5  / 5 Tibialis Anterior/Gastrocnemius-Soleus, EHL/FHL         warm, well-perfused; capillary refill < 3 seconds         negative calf tenderness B/L LE  LABS:                        10.1   14.36 )-----------( 161      ( 24 Feb 2019 06:59 )             32.3     02-24    141  |  105  |  26<H>  ----------------------------<  131<H>  4.0   |  27  |  0.89    Ca    8.2<L>      24 Feb 2019 06:59  Phos  3.5     02-23  Mg     2.0     02-24    TPro  5.6<L>  /  Alb  2.6<L>  /  TBili  0.6  /  DBili  x   /  AST  20  /  ALT  21  /  AlkPhos  81  02-23        MEDICATIONS:      Pain management:  morphine  - Injectable 4 milliGRAM(s) IV Push every 4 hours PRN  oxyCODONE    IR 5 milliGRAM(s) Oral every 3 hours PRN  oxyCODONE    IR 10 milliGRAM(s) Oral every 3 hours PRN    DVT prophylaxis:   enoxaparin Injectable 40 milliGRAM(s) SubCutaneous daily  pentoxifylline 400 milliGRAM(s) Oral three times a day      RADIOLOGY & ADDITIONAL STUDIES:    ASSESSMENT AND PLAN:     - Analgesic pain control  - DVT prophylaxis: Lovenox 40mg daily    SCDs       - PT/OT: Weight Bearing Status:  Weight bearing as tolerated, OOBTC       -  Incentive spirometry  - IVF  - Advance diet as tolerated  - Hospitalist is following  - Follow up HV output  -  Follow up labs  -  Disposition:  Subacute Rehab

## 2019-02-24 NOTE — PHYSICAL THERAPY INITIAL EVALUATION ADULT - ADDITIONAL COMMENTS
Uses RW for ambulation greater than 2 blocks. In house no AD. Lives ground floor senior housing. Owns a RW. 1 step to enter.

## 2019-02-24 NOTE — PROGRESS NOTE ADULT - ASSESSMENT
89M HTN, HLD, PAD admitted for Right IT Fracture and is post ORIF.    Right IT Fracture S/P ORIF POD 1   Related to mechanical Fall.  Continue pain control and bowel regimen.  Incentive spirometer for lung expansion. Work with PT/OT.  Ortho on board. Monitor Hgb and electrolytes.     HTN  Controlled but holding Lisinopril and HCTZ and probably resume tomorrow.     Aortic Stenosis  Maintain hemodynamics and volume.  Cardio on board    HLD   Atorvastatin    BPH  Flomax    Diet  Regular    DVT Prophylaxis  Heparin 5000 SC TID     Disposition  Full Code/Inpatient  PT/OT Eval post operative

## 2019-02-24 NOTE — PHYSICAL THERAPY INITIAL EVALUATION ADULT - ACTIVE RANGE OF MOTION EXAMINATION, REHAB EVAL
Right hip and knee decreased to half range due to sx. Right ankle WFL/Left LE Active ROM was WFL (within functional limits)/deficits as listed below/bilateral upper extremity Active ROM was WFL (within functional limits)

## 2019-02-25 ENCOUNTER — TRANSCRIPTION ENCOUNTER (OUTPATIENT)
Age: 84
End: 2019-02-25

## 2019-02-25 LAB
ANION GAP SERPL CALC-SCNC: 8 MMOL/L — SIGNIFICANT CHANGE UP (ref 5–17)
BUN SERPL-MCNC: 27 MG/DL — HIGH (ref 7–23)
CALCIUM SERPL-MCNC: 8.2 MG/DL — LOW (ref 8.4–10.5)
CHLORIDE SERPL-SCNC: 105 MMOL/L — SIGNIFICANT CHANGE UP (ref 96–108)
CO2 SERPL-SCNC: 29 MMOL/L — SIGNIFICANT CHANGE UP (ref 22–31)
CREAT SERPL-MCNC: 0.71 MG/DL — SIGNIFICANT CHANGE UP (ref 0.5–1.3)
GLUCOSE SERPL-MCNC: 141 MG/DL — HIGH (ref 70–99)
HCT VFR BLD CALC: 33 % — LOW (ref 39–50)
HGB BLD-MCNC: 10.2 G/DL — LOW (ref 13–17)
MCHC RBC-ENTMCNC: 26.8 PG — LOW (ref 27–34)
MCHC RBC-ENTMCNC: 30.9 GM/DL — LOW (ref 32–36)
MCV RBC AUTO: 86.8 FL — SIGNIFICANT CHANGE UP (ref 80–100)
NRBC # BLD: 0 /100 WBCS — SIGNIFICANT CHANGE UP (ref 0–0)
PLATELET # BLD AUTO: 157 K/UL — SIGNIFICANT CHANGE UP (ref 150–400)
POTASSIUM SERPL-MCNC: 4.2 MMOL/L — SIGNIFICANT CHANGE UP (ref 3.5–5.3)
POTASSIUM SERPL-SCNC: 4.2 MMOL/L — SIGNIFICANT CHANGE UP (ref 3.5–5.3)
RBC # BLD: 3.8 M/UL — LOW (ref 4.2–5.8)
RBC # FLD: 15.9 % — HIGH (ref 10.3–14.5)
SODIUM SERPL-SCNC: 142 MMOL/L — SIGNIFICANT CHANGE UP (ref 135–145)
WBC # BLD: 17.7 K/UL — HIGH (ref 3.8–10.5)
WBC # FLD AUTO: 17.7 K/UL — HIGH (ref 3.8–10.5)

## 2019-02-25 PROCEDURE — 12345: CPT | Mod: NC

## 2019-02-25 PROCEDURE — 99233 SBSQ HOSP IP/OBS HIGH 50: CPT

## 2019-02-25 RX ORDER — SODIUM CHLORIDE 9 MG/ML
1000 INJECTION, SOLUTION INTRAVENOUS
Qty: 0 | Refills: 0 | Status: DISCONTINUED | OUTPATIENT
Start: 2019-02-25 | End: 2019-02-26

## 2019-02-25 RX ORDER — ENOXAPARIN SODIUM 100 MG/ML
40 INJECTION SUBCUTANEOUS
Qty: 0 | Refills: 0 | COMMUNITY
Start: 2019-02-25

## 2019-02-25 RX ORDER — OXYCODONE HYDROCHLORIDE 5 MG/1
1 TABLET ORAL
Qty: 0 | Refills: 0 | COMMUNITY
Start: 2019-02-25

## 2019-02-25 RX ADMIN — ATORVASTATIN CALCIUM 10 MILLIGRAM(S): 80 TABLET, FILM COATED ORAL at 21:16

## 2019-02-25 RX ADMIN — TAMSULOSIN HYDROCHLORIDE 0.4 MILLIGRAM(S): 0.4 CAPSULE ORAL at 21:16

## 2019-02-25 RX ADMIN — ENOXAPARIN SODIUM 40 MILLIGRAM(S): 100 INJECTION SUBCUTANEOUS at 08:16

## 2019-02-25 RX ADMIN — Medication 400 MILLIGRAM(S): at 21:16

## 2019-02-25 RX ADMIN — Medication 400 MILLIGRAM(S): at 17:45

## 2019-02-25 RX ADMIN — OXYCODONE HYDROCHLORIDE 10 MILLIGRAM(S): 5 TABLET ORAL at 10:57

## 2019-02-25 RX ADMIN — SODIUM CHLORIDE 100 MILLILITER(S): 9 INJECTION, SOLUTION INTRAVENOUS at 05:30

## 2019-02-25 RX ADMIN — OXYCODONE HYDROCHLORIDE 10 MILLIGRAM(S): 5 TABLET ORAL at 11:30

## 2019-02-25 NOTE — DISCHARGE NOTE ADULT - MEDICATION SUMMARY - MEDICATIONS TO TAKE
I will START or STAY ON the medications listed below when I get home from the hospital:    oxyCODONE 5 mg oral tablet  -- 1 tab(s) by mouth every 3 hours, As needed, Mild Pain (1 - 3)  -- Indication: For Pain mgmt    Prinivil 40 mg oral tablet  -- 1 tab(s) by mouth once a day  -- Indication: For Hypertension    tamsulosin 0.4 mg oral capsule  -- 1 cap(s) by mouth once a day  -- Indication: For Prostate    enoxaparin  -- 40 milligram(s) subcutaneous once a day  -- Indication: For dvt prevention    Lipitor 10 mg oral tablet  -- 1 tab(s) by mouth once a day  -- Indication: For Cholesterol    hydroCHLOROthiazide 25 mg oral tablet  -- 1 tab(s) by mouth once a day  -- Indication: For Hypertension    TRENtal 400 mg oral tablet, extended release  -- 1 tab(s) by mouth 3 times a day  -- Indication: For Coagulation I will START or STAY ON the medications listed below when I get home from the hospital:    oxyCODONE 5 mg oral tablet  -- 1 tab(s) by mouth every 3 hours, As needed, Mild Pain (1 - 3)  -- Indication: For Pain mgmt    Prinivil 40 mg oral tablet  -- 1 tab(s) by mouth once a day  -- Indication: For Hypertension    tamsulosin 0.4 mg oral capsule  -- 1 cap(s) by mouth once a day  -- Indication: For Prostate    enoxaparin  -- 40 milligram(s) subcutaneous once a day  -- Indication: For dvt prevention    Lipitor 10 mg oral tablet  -- 1 tab(s) by mouth once a day  -- Indication: For Cholesterol    albuterol 2.5 mg/3 mL (0.083%) inhalation solution  -- 1 inhaler(s) inhaled every 6 hours, As Needed difficulty beathing  -- Indication: For Cough    ipratropium-albuterol 0.5 mg-2.5 mg/3 mLinhalation solution  -- 3 milliliter(s) inhaled every 6 hours, As needed, Shortness of Breath  -- Indication: For Cough    betamethasone valerate 0.1% topical cream  -- 1 application on skin 2 times a day   -- Indication: For rash    furosemide 20 mg oral tablet  -- 1 tab(s) by mouth once a day  -- Indication: For Chf    hydroCHLOROthiazide 25 mg oral tablet  -- 1 tab(s) by mouth once a day  -- Indication: For Hypertension    TRENtal 400 mg oral tablet, extended release  -- 1 tab(s) by mouth 3 times a day  -- Indication: For Coagulation    timolol-dorzolamide 0.5%-2% preservative-free ophthalmic solution  -- 1 drop(s) to each affected eye every 12 hours both eyes  -- Indication: For Eye drop    latanoprost 0.005% ophthalmic solution  -- 1 drop(s) to each affected eye once a day (at bedtime) both eyes  -- Indication: For Eye drop

## 2019-02-25 NOTE — DISCHARGE NOTE ADULT - CARE PLAN
Principal Discharge DX:	Closed fracture of right hip, initial encounter  Goal:	Improvement of Activities of Daily Living  Assessment and plan of treatment:	-Weight bearing as tolerated with assistive devices  -Keep incision area clean and dry until office visit.  Cover with plastic and waterproof tape to shower  -Call MD for severe pain/fever/chills  Assessment and plan of treatment:	For Constipation :   • Increase your water intake. Drink at least 8 glasses of water daily.  • Try adding fiber to your diet by eating fruits, vegetables and foods that are rich in grains.  • If you do experience constipation, you may take an over-the-counter stool softener/laxative such as Terri Colace, Senekot or Milk of Magnesia.  - Call your doctor if you experience:  • An increase in pain not controlled by pain medication or change in activity or  position.  • Temperature greater than 101° F.  • Redness, increased swelling or foul smelling drainage from or around the  incision.  • Numbness, tingling or a change in color or temperature of the operative leg.  • Call your doctor immediately if you experience chest pain, shortness of breath or calf pain.

## 2019-02-25 NOTE — OCCUPATIONAL THERAPY INITIAL EVALUATION ADULT - IADL RETRAINING, OT EVAL
Patient will increase standing tolerance to 2-3 minutes for grooming at the sink within 2-4 sessions

## 2019-02-25 NOTE — DISCHARGE NOTE ADULT - HOSPITAL COURSE
89M with HTN, HLD, peripheral artery disease, arthritis, who presents with right hip pain after a fall on 2/22/19.  Patient was getting ready to eat his dinner when he slipped and landed on his right gluteus/hip.  Patient said he was wearing slippers that had no traction.  Denied any head trauma or lost of consciousness.  Denied any chest pain, SOB, palpitations right before fall.  Patient was brought here to the ED where he was found to have a right intertrochanteric fracture.  Prior to the fall, patient has been in his usual state of health.  Does complain of chronic arthritic knee pain, intermittent   constipation, and recent swelling of BLE for the past 2 months.  Denied any recent fevers, cough, diarrhea.  Orthopedics was consulted and recommended surgery.    Community ambulator at base line.   Patient medically cleared to undergo Right hip ORIF on 2/23/19 with Dr. Lopez.  No operative or susan-operative complications arose during patients hospital course. POD 3 patient started to desaturate to 70's, XRs obtained, pulmonology consult, Zosyn started. CT chest obtained shows effusions and atelectasis and possible pulm mass left side - no immediate intervention for left mass  effusions, IR thoracentesis performed 3/4/19,3/6/19. Shortness on breath much improved. Patient received antibiotic according to SCIP guidelines for infection prevention.  Lovenox was given for DVT prophylaxis.  Anesthesia, Medical Hospitalist, Physical Therapy and Occupational Therapy were consulted. Patient is stable for discharge with a good prognosis.  Appropriate discharge instructions and medications are provided in this document. 89M with HTN, HLD, peripheral artery disease, arthritis, who presents with right hip pain after a fall on 2/22/19.  Patient was getting ready to eat his dinner when he slipped and landed on his right gluteus/hip.  Patient said he was wearing slippers that had no traction.  Denied any head trauma or lost of consciousness.  Denied any chest pain, SOB, palpitations right before fall.  Patient was brought here to the ED where he was found to have a right intertrochanteric fracture.  Prior to the fall, patient has been in his usual state of health.  Does complain of chronic arthritic knee pain, intermittent   constipation, and recent swelling of BLE for the past 2 months.  Denied any recent fevers, cough, diarrhea.  Orthopedics was consulted and recommended surgery.    Community ambulator at base line.   Patient medically cleared to undergo Right hip ORIF on 2/23/19 with Dr. Lopez.  No operative or susan-operative complications arose during patients hospital course. POD 3 patient started to desaturate to 70's, XRs obtained, pulmonology consult, Zosyn started. CT chest obtained shows effusions and atelectasis and possible pulm mass left side - no immediate intervention for left mass  effusions, IR thoracentesis performed 3/4/19,3/6/19. Shortness on breath much improved. Patient received antibiotic according to SCIP guidelines for infection prevention.  Lovenox was given for DVT prophylaxis.  Anesthesia, Medical Hospitalist, Physical Therapy and Occupational Therapy were consulted. Patient is stable for discharge with a good prognosis.  Appropriate discharge instructions and medications are provided in this document.     Medical discharge note  89M HTN, HLD, PAD and Moderate Aortic Stenosis admitted for Right IT Fracture and is post ORIF.    Acute Respiratory Failure  Secondary to volume overload and possible PNA from atelectasis. Significant Improvement with diuresis + Abx   -PE workup negative on CTA.    -Blood culture negative  -Echo done reveals mild diastolic CHF with moderate AS.   -Continue with  lasix  -S/P Thoracentesis results consistent with possible CHF, Culture negative  -Pulmonary and Cardiology to follow up   -Ok for discharge as per pulmonary      Right IT Fracture S/P ORIF   Continue with pain management, DVT proph, and wound care as per Ortho.  PT/OT       HTN  Lisinopril + HCTZ    Aortic Stenosis  Maintain hemodynamics and volume.  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  Will follow up as outpatient with PMD    -multinodular goiter:  tsh, t3, t4 ok  us thyroid shows multinodular goiter  Outpatient follow up with endocrinology  Overall prognosis Guarded  Hospital course, and discharge planning were discussed with patient, and son in great details.  seems to understand, and in agreement

## 2019-02-25 NOTE — PHARMACOTHERAPY INTERVENTION NOTE - REASON FOR NOTE
Trental 400mg never received from patient's family member. it has borrowed from [facility] to be dispensed to the patient.

## 2019-02-25 NOTE — OCCUPATIONAL THERAPY INITIAL EVALUATION ADULT - ADDITIONAL COMMENTS
Uses RW for ambulation greater than 2 blocks. In house no AD. Lives ground floor senior housing. Owns a RW. 1 step to enter. Uses RW for ambulation greater than 2 blocks. In house no AD. Lives ground floor senior housing. Owns a RW. 1 step to enter. + both stall & tub shower (tub has 2 grab bars), "low" toilet

## 2019-02-25 NOTE — PROGRESS NOTE ADULT - SUBJECTIVE AND OBJECTIVE BOX
ORTHOPEDIC PA PROGRESS NOTE  JAK MARY      89y Male                                                                                                                               POD #2        STATUS POST:               Pre-Op Dx: Closed fracture of right hip    Post-Op Dx:  Closed fracture of right hip    Procedure: Open reduction and internal fixation (ORIF) of hip                                                Pain (0-10): 5   Current Pain Management:  [ ] PCA   [ ] Po Analgesics [ ] IM /IV Anagesics     T(F): 98.4  HR: 100  BP: 143/69  RR: 20  SpO2: 93%                        10.2   17.70 )-----------( 157      ( 25 Feb 2019 06:36 )             33.0                     02-25    142  |  105  |  27<H>  ----------------------------<  141<H>  4.2   |  29  |  0.71    Ca    8.2<L>      25 Feb 2019 06:36  Mg     2.0     02-24      Physical Exam :    -   Dressing changed sterile.   -   Wound C/D/I.   -   Distal Neurvascular status intact grossly.   -   Warm well perfused; capillary refill <3 seconds   -   (+)EHL/FHL 5/5  -   (+) Sensation to light touch  -   (-) Calf tenderness Bilaterally    A/P: 89y Male s/p Closed fracture of right hip    -   Ortho Stable  -   Pain control   -   Medicine to follow  -   DVT ppx:     [x ]SCDs     [ ] ASA     [ ] Eliquis     [x ] Lovenox  -   Weight bearing status:  WBAT [x ]        PWB    [ ]     TTWB  [ ]      NWB  [ ]   -  Dispo:     Home [ ]     Acute Rehab [ ]     MAURICE [ ]     TBD [ x]  -  Berry placed by medicine this morning due to retention

## 2019-02-25 NOTE — OCCUPATIONAL THERAPY INITIAL EVALUATION ADULT - TRANSFER TRAINING, PT EVAL
Patient will transfer to toilet with DME as needed with mod A x 2 within 2-4 sessions Patient will transfer to toilet with DME as needed with min A x 2 within 2-4 sessions

## 2019-02-25 NOTE — DISCHARGE NOTE ADULT - PATIENT PORTAL LINK FT
You can access the [email or web address], offered by [facility], by registering with the following website: [email or web address]

## 2019-02-25 NOTE — DISCHARGE NOTE ADULT - CARE PROVIDER_API CALL
Ramez Lopez)  Orthopaedic Surgery  [address]  [place] [address]  Phone: [phone]  Fax: [phone]  Follow Up Time:

## 2019-02-25 NOTE — PROGRESS NOTE ADULT - ASSESSMENT
89M HTN, HLD, PAD admitted for Right IT Fracture and is post ORIF.    Right IT Fracture S/P ORIF POD 2  Related to mechanical Fall.  Continue pain control and bowel regimen.  Incentive spirometer for lung expansion. Work with PT/OT.  Ortho on board. Monitor Hgb and electrolytes.     HTN  Controlled but holding Lisinopril and HCTZ and probably resume tomorrow.     Aortic Stenosis  Maintain hemodynamics and volume.  Cardio on board    HLD   Atorvastatin    BPH  Flomax    Diet  Regular    DVT Prophylaxis  Heparin 5000 SC TID     Disposition  Full Code/Inpatient  PT/OT Eval post operative

## 2019-02-25 NOTE — DISCHARGE NOTE ADULT - PLAN OF CARE
Improvement of Activities of Daily Living -Weight bearing as tolerated with assistive devices  -Keep incision area clean and dry until office visit.  Cover with plastic and waterproof tape to shower  -Call MD for severe pain/fever/chills For Constipation :   • Increase your water intake. Drink at least 8 glasses of water daily.  • Try adding fiber to your diet by eating fruits, vegetables and foods that are rich in grains.  • If you do experience constipation, you may take an over-the-counter stool softener/laxative such as Terri Colace, Senekot or Milk of Magnesia.  - Call your doctor if you experience:  • An increase in pain not controlled by pain medication or change in activity or  position.  • Temperature greater than 101° F.  • Redness, increased swelling or foul smelling drainage from or around the  incision.  • Numbness, tingling or a change in color or temperature of the operative leg.  • Call your doctor immediately if you experience chest pain, shortness of breath or calf pain.

## 2019-02-25 NOTE — PROGRESS NOTE ADULT - SUBJECTIVE AND OBJECTIVE BOX
Subjective: Doing well no complaints. IV infiltrated.     MEDICATIONS  (STANDING):  atorvastatin 10 milliGRAM(s) Oral at bedtime  enoxaparin Injectable 40 milliGRAM(s) SubCutaneous daily  lactated ringers. 1000 milliLiter(s) (100 mL/Hr) IV Continuous <Continuous>  pentoxifylline 400 milliGRAM(s) Oral three times a day  tamsulosin 0.4 milliGRAM(s) Oral at bedtime    MEDICATIONS  (PRN):  morphine  - Injectable 4 milliGRAM(s) IV Push every 4 hours PRN Moderate Pain (4 - 6)  oxyCODONE    IR 5 milliGRAM(s) Oral every 3 hours PRN Mild Pain (1 - 3)  oxyCODONE    IR 10 milliGRAM(s) Oral every 3 hours PRN Moderate Pain (4 - 6)      Allergies    No Known Allergies    Intolerances    Vital Signs Last 24 Hrs  T(C): 36.9 (25 Feb 2019 07:38), Max: 36.9 (25 Feb 2019 07:38)  T(F): 98.4 (25 Feb 2019 07:38), Max: 98.4 (25 Feb 2019 07:38)  HR: 100 (25 Feb 2019 07:38) (81 - 102)  BP: 143/69 (25 Feb 2019 07:38) (120/74 - 157/78)  BP(mean): --  RR: 20 (25 Feb 2019 07:38) (16 - 20)  SpO2: 93% (25 Feb 2019 07:38) (93% - 98%)    PHYSICAL EXAM:  GENERAL: NAD, well-groomed, well-developed  HEAD:  Atraumatic, Normocephalic  EYES: EOMI, PERRLA, conjunctiva and sclera clear  HEART: Regular rate and rhythm; No murmurs, rubs, or gallops  ABDOMEN: Soft, Nontender, Nondistended; Bowel sounds present  EXTREMITIES:  2+ Peripheral Pulses, No clubbing, cyanosis, or edema  LYMPH: No lymphadenopathy noted      LABS:                        10.2   17.70 )-----------( 157      ( 25 Feb 2019 06:36 )             33.0     25 Feb 2019 06:36    142    |  105    |  27     ----------------------------<  141    4.2     |  29     |  0.71     Ca    8.2        25 Feb 2019 06:36          CAPILLARY BLOOD GLUCOSE          RADIOLOGY & ADDITIONAL TESTS:    Imaging Personally Reviewed:  [ ] YES     Consultant(s) Notes Reviewed:      Care Discussed with Consultants/Other Providers:    Advanced Directives: [ ] DNR  [ ] No feeding tube  [ ] MOLST in chart  [ ] MOLST completed today  [ ] Unknown

## 2019-02-26 LAB
ANION GAP SERPL CALC-SCNC: 7 MMOL/L — SIGNIFICANT CHANGE UP (ref 5–17)
BASOPHILS # BLD AUTO: 0.04 K/UL — SIGNIFICANT CHANGE UP (ref 0–0.2)
BASOPHILS NFR BLD AUTO: 0.2 % — SIGNIFICANT CHANGE UP (ref 0–2)
BUN SERPL-MCNC: 23 MG/DL — SIGNIFICANT CHANGE UP (ref 7–23)
CALCIUM SERPL-MCNC: 7.8 MG/DL — LOW (ref 8.4–10.5)
CHLORIDE SERPL-SCNC: 100 MMOL/L — SIGNIFICANT CHANGE UP (ref 96–108)
CO2 SERPL-SCNC: 28 MMOL/L — SIGNIFICANT CHANGE UP (ref 22–31)
CREAT SERPL-MCNC: 1.48 MG/DL — HIGH (ref 0.5–1.3)
EOSINOPHIL # BLD AUTO: 0.03 K/UL — SIGNIFICANT CHANGE UP (ref 0–0.5)
EOSINOPHIL NFR BLD AUTO: 0.2 % — SIGNIFICANT CHANGE UP (ref 0–6)
GLUCOSE SERPL-MCNC: 109 MG/DL — HIGH (ref 70–99)
HCT VFR BLD CALC: 31 % — LOW (ref 39–50)
HGB BLD-MCNC: 9.8 G/DL — LOW (ref 13–17)
IMM GRANULOCYTES NFR BLD AUTO: 0.4 % — SIGNIFICANT CHANGE UP (ref 0–1.5)
LACTATE SERPL-SCNC: 1.3 MMOL/L — SIGNIFICANT CHANGE UP (ref 0.7–2)
LYMPHOCYTES # BLD AUTO: 1.16 K/UL — SIGNIFICANT CHANGE UP (ref 1–3.3)
LYMPHOCYTES # BLD AUTO: 7.2 % — LOW (ref 13–44)
MCHC RBC-ENTMCNC: 27.1 PG — SIGNIFICANT CHANGE UP (ref 27–34)
MCHC RBC-ENTMCNC: 31.6 GM/DL — LOW (ref 32–36)
MCV RBC AUTO: 85.6 FL — SIGNIFICANT CHANGE UP (ref 80–100)
MONOCYTES # BLD AUTO: 1.22 K/UL — HIGH (ref 0–0.9)
MONOCYTES NFR BLD AUTO: 7.6 % — SIGNIFICANT CHANGE UP (ref 2–14)
NEUTROPHILS # BLD AUTO: 13.5 K/UL — HIGH (ref 1.8–7.4)
NEUTROPHILS NFR BLD AUTO: 84.4 % — HIGH (ref 43–77)
NRBC # BLD: 0 /100 WBCS — SIGNIFICANT CHANGE UP (ref 0–0)
PLATELET # BLD AUTO: 151 K/UL — SIGNIFICANT CHANGE UP (ref 150–400)
POTASSIUM SERPL-MCNC: 4.2 MMOL/L — SIGNIFICANT CHANGE UP (ref 3.5–5.3)
POTASSIUM SERPL-SCNC: 4.2 MMOL/L — SIGNIFICANT CHANGE UP (ref 3.5–5.3)
RBC # BLD: 3.62 M/UL — LOW (ref 4.2–5.8)
RBC # FLD: 15.9 % — HIGH (ref 10.3–14.5)
SODIUM SERPL-SCNC: 135 MMOL/L — SIGNIFICANT CHANGE UP (ref 135–145)
WBC # BLD: 16.02 K/UL — HIGH (ref 3.8–10.5)
WBC # FLD AUTO: 16.02 K/UL — HIGH (ref 3.8–10.5)

## 2019-02-26 PROCEDURE — 99233 SBSQ HOSP IP/OBS HIGH 50: CPT

## 2019-02-26 PROCEDURE — 71045 X-RAY EXAM CHEST 1 VIEW: CPT | Mod: 26

## 2019-02-26 RX ORDER — IPRATROPIUM/ALBUTEROL SULFATE 18-103MCG
3 AEROSOL WITH ADAPTER (GRAM) INHALATION ONCE
Qty: 0 | Refills: 0 | Status: COMPLETED | OUTPATIENT
Start: 2019-02-26 | End: 2019-02-26

## 2019-02-26 RX ORDER — VANCOMYCIN HCL 1 G
1000 VIAL (EA) INTRAVENOUS ONCE
Qty: 0 | Refills: 0 | Status: COMPLETED | OUTPATIENT
Start: 2019-02-26 | End: 2019-02-26

## 2019-02-26 RX ORDER — SODIUM CHLORIDE 9 MG/ML
1000 INJECTION INTRAMUSCULAR; INTRAVENOUS; SUBCUTANEOUS
Qty: 0 | Refills: 0 | Status: DISCONTINUED | OUTPATIENT
Start: 2019-02-26 | End: 2019-02-27

## 2019-02-26 RX ORDER — DORZOLAMIDE HYDROCHLORIDE TIMOLOL MALEATE 20; 5 MG/ML; MG/ML
1 SOLUTION/ DROPS OPHTHALMIC EVERY 12 HOURS
Qty: 0 | Refills: 0 | Status: DISCONTINUED | OUTPATIENT
Start: 2019-02-26 | End: 2019-03-07

## 2019-02-26 RX ORDER — IPRATROPIUM/ALBUTEROL SULFATE 18-103MCG
3 AEROSOL WITH ADAPTER (GRAM) INHALATION EVERY 6 HOURS
Qty: 0 | Refills: 0 | Status: DISCONTINUED | OUTPATIENT
Start: 2019-02-26 | End: 2019-02-27

## 2019-02-26 RX ORDER — PIPERACILLIN AND TAZOBACTAM 4; .5 G/20ML; G/20ML
3.38 INJECTION, POWDER, LYOPHILIZED, FOR SOLUTION INTRAVENOUS EVERY 8 HOURS
Qty: 0 | Refills: 0 | Status: DISCONTINUED | OUTPATIENT
Start: 2019-02-26 | End: 2019-03-06

## 2019-02-26 RX ORDER — LATANOPROST 0.05 MG/ML
1 SOLUTION/ DROPS OPHTHALMIC; TOPICAL AT BEDTIME
Qty: 0 | Refills: 0 | Status: DISCONTINUED | OUTPATIENT
Start: 2019-02-26 | End: 2019-03-07

## 2019-02-26 RX ADMIN — LATANOPROST 1 DROP(S): 0.05 SOLUTION/ DROPS OPHTHALMIC; TOPICAL at 21:36

## 2019-02-26 RX ADMIN — Medication 250 MILLIGRAM(S): at 17:27

## 2019-02-26 RX ADMIN — SODIUM CHLORIDE 100 MILLILITER(S): 9 INJECTION, SOLUTION INTRAVENOUS at 13:08

## 2019-02-26 RX ADMIN — TAMSULOSIN HYDROCHLORIDE 0.4 MILLIGRAM(S): 0.4 CAPSULE ORAL at 21:35

## 2019-02-26 RX ADMIN — DORZOLAMIDE HYDROCHLORIDE TIMOLOL MALEATE 1 DROP(S): 20; 5 SOLUTION/ DROPS OPHTHALMIC at 21:36

## 2019-02-26 RX ADMIN — Medication 400 MILLIGRAM(S): at 21:34

## 2019-02-26 RX ADMIN — SODIUM CHLORIDE 75 MILLILITER(S): 9 INJECTION INTRAMUSCULAR; INTRAVENOUS; SUBCUTANEOUS at 13:47

## 2019-02-26 RX ADMIN — Medication 400 MILLIGRAM(S): at 05:08

## 2019-02-26 RX ADMIN — PIPERACILLIN AND TAZOBACTAM 25 GRAM(S): 4; .5 INJECTION, POWDER, LYOPHILIZED, FOR SOLUTION INTRAVENOUS at 21:35

## 2019-02-26 RX ADMIN — ATORVASTATIN CALCIUM 10 MILLIGRAM(S): 80 TABLET, FILM COATED ORAL at 21:34

## 2019-02-26 RX ADMIN — Medication 3 MILLILITER(S): at 23:44

## 2019-02-26 RX ADMIN — OXYCODONE HYDROCHLORIDE 5 MILLIGRAM(S): 5 TABLET ORAL at 15:05

## 2019-02-26 RX ADMIN — Medication 3 MILLILITER(S): at 10:32

## 2019-02-26 RX ADMIN — ENOXAPARIN SODIUM 40 MILLIGRAM(S): 100 INJECTION SUBCUTANEOUS at 08:17

## 2019-02-26 RX ADMIN — Medication 400 MILLIGRAM(S): at 15:57

## 2019-02-26 RX ADMIN — OXYCODONE HYDROCHLORIDE 5 MILLIGRAM(S): 5 TABLET ORAL at 08:16

## 2019-02-26 RX ADMIN — Medication 3 MILLILITER(S): at 12:17

## 2019-02-26 RX ADMIN — Medication 3 MILLILITER(S): at 20:25

## 2019-02-26 NOTE — PROGRESS NOTE ADULT - SUBJECTIVE AND OBJECTIVE BOX
Subjective: Patient desaturating into the 80's at room air. Not tachycardic or symptomatic. Worked with PT and was started to desaturate into the 70s.     MEDICATIONS  (STANDING):  ALBUTerol/ipratropium for Nebulization. 3 milliLiter(s) Nebulizer once  atorvastatin 10 milliGRAM(s) Oral at bedtime  enoxaparin Injectable 40 milliGRAM(s) SubCutaneous daily  lactated ringers. 1000 milliLiter(s) (100 mL/Hr) IV Continuous <Continuous>  pentoxifylline 400 milliGRAM(s) Oral three times a day  tamsulosin 0.4 milliGRAM(s) Oral at bedtime    MEDICATIONS  (PRN):  morphine  - Injectable 4 milliGRAM(s) IV Push every 4 hours PRN Moderate Pain (4 - 6)  oxyCODONE    IR 5 milliGRAM(s) Oral every 3 hours PRN Mild Pain (1 - 3)  oxyCODONE    IR 10 milliGRAM(s) Oral every 3 hours PRN Moderate Pain (4 - 6)      Allergies    No Known Allergies    Intolerances      Vital Signs Last 24 Hrs  T(C): 36.6 (26 Feb 2019 07:52), Max: 36.7 (25 Feb 2019 23:03)  T(F): 97.9 (26 Feb 2019 07:52), Max: 98 (25 Feb 2019 23:03)  HR: 113 (26 Feb 2019 09:25) (80 - 113)  BP: 103/64 (26 Feb 2019 07:52) (103/64 - 171/62)  BP(mean): --  RR: 20 (26 Feb 2019 09:23) (16 - 20)  SpO2: 88% (26 Feb 2019 09:55) (80% - 100%)    PHYSICAL EXAM:  GENERAL: NAD, well-groomed, well-developed  HEAD:  Atraumatic, Normocephalic  EYES: EOMI, PERRLA, conjunctiva and sclera clear  ENMT: Moist mucous membranes, Good dentition, No lesions; No tonsillar erythema, exudates, or enlargement  NECK: Supple, No JVD, Normal thyroid  NERVOUS SYSTEM:  Alert & Oriented X3, Good concentration; All 4 extremities mobile, no gross sensory deficits.   CHEST/LUNG: B/L Wheezing in all lung fields;   HEART: Regular rate and rhythm; No murmurs, rubs, or gallops  ABDOMEN: Soft, Nontender, Nondistended; Bowel sounds present  EXTREMITIES:  2+ Peripheral Pulses, No clubbing, cyanosis, or edema      LABS:                        9.8    16.02 )-----------( 151      ( 26 Feb 2019 07:11 )             31.0     26 Feb 2019 07:11    135    |  100    |  23     ----------------------------<  109    4.2     |  28     |  1.48     Ca    7.8        26 Feb 2019 07:11          CAPILLARY BLOOD GLUCOSE          RADIOLOGY & ADDITIONAL TESTS:    Imaging Personally Reviewed:  [ ] YES     Consultant(s) Notes Reviewed:      Care Discussed with Consultants/Other Providers:    Advanced Directives: [ ] DNR  [ ] No feeding tube  [ ] MOLST in chart  [ ] MOLST completed today  [ ] Unknown

## 2019-02-26 NOTE — PROGRESS NOTE ADULT - ASSESSMENT
89M HTN, HLD, PAD admitted for Right IT Fracture and is post ORIF.    Acute Respiratory Failure  Placed on non breather with improvement on saturations; Atelectasis vs. Bronchospasm vs. PE. vs. PNA.  CXR reviewed. WBC elevated with 87% Neutrophils but no fever. Favor Atelectasis and Bronchospasm at this point.  Check Procalcitonin, Initiate Chest PT, Duonebs RTC Q4H x 4 doses and maintain O2 > 92%. If no improvement then will pursue PE workup.  Echo done reveals mild diastolic CHF with moderate AS.     Acute Renal Failure  Trial IVF and recheck BMP and electrolytes.     Right IT Fracture S/P ORIF POD 2  Related to mechanical Fall.  Continue pain control and bowel regimen.  Incentive spirometer for lung expansion. Work with PT/OT.  Ortho on board. Monitor Hgb and electrolytes.     HTN  Controlled but holding Lisinopril and HCTZ and probably resume tomorrow.     Aortic Stenosis  Maintain hemodynamics and volume.  Cardio on board    HLD   Atorvastatin    BPH  Flomax    Diet  Regular    DVT Prophylaxis  Heparin 5000 SC TID     Disposition  Full Code/Inpatient  Discharge planning pending hospital course; Holding discharge today.

## 2019-02-26 NOTE — PROGRESS NOTE ADULT - SUBJECTIVE AND OBJECTIVE BOX
ORTHOPEDIC PA PROGRESS NOTE  JAK MARY      89y Male                                                                                                                               POD #    4d    STATUS POST:       Procedure: Open reduction and internal fixation (ORIF) of hip           No complaints.      NAD pt Comfortable  Current Pain Management:  PRN    T(F): 97.9  HR: 80  BP: 103/64  RR: 18  SpO2: 98%                         9.8    16.02 )-----------( 151      ( 26 Feb 2019 07:11 )             31.0         02-26    135  |  100  |  23  ----------------------------<  109<H>  4.2   |  28  |  1.48<H>    Ca    7.8<L>      26 Feb 2019 07:11      Physical Exam :    -   Dressing C/D/I.   -   Distal Neurvascular status intact grossly.   -   Warm well perfused; capillary refill <3 seconds   -   (+)EHL/FHL   -   (+) Sensation to light touch  -   (-) Calf tenderness Bilaterally    A/P: 89y Male s/p Open reduction and internal fixation (ORIF) of hip     -   Ortho Stable  -   Pain control:  PRN  -   Medicine to follow - ruling out PNA  -   DVT ppx:    PAS +  enoxaparin Injectable: 40 milliGRAM(s) SubCutaneous, pentoxifylline: 400 milliGRAM(s) Oral, pentoxifylline: 400 milliGRAM(s) Oral, pentoxifylline: 400 milliGRAM(s) Oral  -   Weight bearing status: WBAT   -  Dis[facility]:   [facility]

## 2019-02-26 NOTE — PROGRESS NOTE ADULT - SUBJECTIVE AND OBJECTIVE BOX
POST OPERATIVE NOTE    s/p  Right Hip ORIF POD#3    Had complained of right ankle pain to RN earlier today.  States the ankle hurts occasionally at rest.  It may have been like this prior to the hip injury according to RN and son..   No pain in the ankle at this time.    Vital Signs Last 24 Hrs  T(C): 37.6 (26 Feb 2019 11:00), Max: 37.6 (26 Feb 2019 11:00)  T(F): 99.7 (26 Feb 2019 11:00), Max: 99.7 (26 Feb 2019 11:00)  HR: 103 (26 Feb 2019 12:18) (80 - 113)  BP: 154/68 (26 Feb 2019 10:30) (103/64 - 171/62)  BP(mean): --  RR: 18 (26 Feb 2019 10:30) (16 - 25)  SpO2: 92% (26 Feb 2019 14:46) (80% - 98%)    Labs:                      9.8    16.02 )-----------( 151      ( 26 Feb 2019 07:11 )             31.0       02-26    135  |  100  |  23  ----------------------------<  109<H>  4.2   |  28  |  1.48<H>    Ca    7.8<L>      26 Feb 2019 07:11            Physical: Right ankle skin intact.  Mild edema. Sensate to light touch.  Nontender malleoli , nontender tibia, nontender proximal tibia, nontender posterior/anterior /lateral and medial tendons  PROM without discomfort, AROM without discomfort.                   +EHL, plantarflexion, dorsiflexion, +pedal pulse                   Calves soft, nontender         Allergies    No Known Allergies    Intolerances      A+P  Ankle pain likely intermittent chronic in nature.  Patient advised to report any further pain or issues concerning ankle.  Orthopedically stable.        VTE prophylaxis enoxaparin Injectable 40 milliGRAM(s) SubCutaneous daily  pentoxifylline 400 milliGRAM(s) Oral three times a day      Physical and Occupational therapy- WBAT    Pain management       Medical care per hospitalist service     Discharge plan to be determined

## 2019-02-27 LAB
ANION GAP SERPL CALC-SCNC: 8 MMOL/L — SIGNIFICANT CHANGE UP (ref 5–17)
BASOPHILS # BLD AUTO: 0.02 K/UL — SIGNIFICANT CHANGE UP (ref 0–0.2)
BASOPHILS NFR BLD AUTO: 0.1 % — SIGNIFICANT CHANGE UP (ref 0–2)
BUN SERPL-MCNC: 21 MG/DL — SIGNIFICANT CHANGE UP (ref 7–23)
CALCIUM SERPL-MCNC: 7.9 MG/DL — LOW (ref 8.4–10.5)
CHLORIDE SERPL-SCNC: 97 MMOL/L — SIGNIFICANT CHANGE UP (ref 96–108)
CO2 SERPL-SCNC: 26 MMOL/L — SIGNIFICANT CHANGE UP (ref 22–31)
CREAT SERPL-MCNC: 0.54 MG/DL — SIGNIFICANT CHANGE UP (ref 0.5–1.3)
EOSINOPHIL # BLD AUTO: 0.04 K/UL — SIGNIFICANT CHANGE UP (ref 0–0.5)
EOSINOPHIL NFR BLD AUTO: 0.3 % — SIGNIFICANT CHANGE UP (ref 0–6)
GLUCOSE SERPL-MCNC: 130 MG/DL — HIGH (ref 70–99)
GRAM STN FLD: SIGNIFICANT CHANGE UP
HCT VFR BLD CALC: 32.9 % — LOW (ref 39–50)
HGB BLD-MCNC: 10.1 G/DL — LOW (ref 13–17)
IMM GRANULOCYTES NFR BLD AUTO: 0.5 % — SIGNIFICANT CHANGE UP (ref 0–1.5)
LYMPHOCYTES # BLD AUTO: 0.95 K/UL — LOW (ref 1–3.3)
LYMPHOCYTES # BLD AUTO: 6.8 % — LOW (ref 13–44)
MCHC RBC-ENTMCNC: 25.8 PG — LOW (ref 27–34)
MCHC RBC-ENTMCNC: 30.7 GM/DL — LOW (ref 32–36)
MCV RBC AUTO: 83.9 FL — SIGNIFICANT CHANGE UP (ref 80–100)
MONOCYTES # BLD AUTO: 1.05 K/UL — HIGH (ref 0–0.9)
MONOCYTES NFR BLD AUTO: 7.5 % — SIGNIFICANT CHANGE UP (ref 2–14)
NEUTROPHILS # BLD AUTO: 11.82 K/UL — HIGH (ref 1.8–7.4)
NEUTROPHILS NFR BLD AUTO: 84.8 % — HIGH (ref 43–77)
NRBC # BLD: 0 /100 WBCS — SIGNIFICANT CHANGE UP (ref 0–0)
PLATELET # BLD AUTO: 193 K/UL — SIGNIFICANT CHANGE UP (ref 150–400)
POTASSIUM SERPL-MCNC: 4.2 MMOL/L — SIGNIFICANT CHANGE UP (ref 3.5–5.3)
POTASSIUM SERPL-SCNC: 4.2 MMOL/L — SIGNIFICANT CHANGE UP (ref 3.5–5.3)
PROCALCITONIN SERPL-MCNC: 0.12 NG/ML — HIGH (ref 0.02–0.1)
RBC # BLD: 3.92 M/UL — LOW (ref 4.2–5.8)
RBC # FLD: 15.7 % — HIGH (ref 10.3–14.5)
SODIUM SERPL-SCNC: 131 MMOL/L — LOW (ref 135–145)
SPECIMEN SOURCE: SIGNIFICANT CHANGE UP
WBC # BLD: 13.95 K/UL — HIGH (ref 3.8–10.5)
WBC # FLD AUTO: 13.95 K/UL — HIGH (ref 3.8–10.5)

## 2019-02-27 PROCEDURE — 99233 SBSQ HOSP IP/OBS HIGH 50: CPT

## 2019-02-27 PROCEDURE — 71275 CT ANGIOGRAPHY CHEST: CPT | Mod: 26

## 2019-02-27 RX ORDER — FUROSEMIDE 40 MG
40 TABLET ORAL ONCE
Qty: 0 | Refills: 0 | Status: COMPLETED | OUTPATIENT
Start: 2019-02-27 | End: 2019-02-27

## 2019-02-27 RX ORDER — IPRATROPIUM/ALBUTEROL SULFATE 18-103MCG
3 AEROSOL WITH ADAPTER (GRAM) INHALATION EVERY 6 HOURS
Qty: 0 | Refills: 0 | Status: DISCONTINUED | OUTPATIENT
Start: 2019-02-27 | End: 2019-03-07

## 2019-02-27 RX ADMIN — Medication 400 MILLIGRAM(S): at 13:52

## 2019-02-27 RX ADMIN — LATANOPROST 1 DROP(S): 0.05 SOLUTION/ DROPS OPHTHALMIC; TOPICAL at 21:36

## 2019-02-27 RX ADMIN — Medication 40 MILLIGRAM(S): at 13:55

## 2019-02-27 RX ADMIN — Medication 3 MILLILITER(S): at 08:03

## 2019-02-27 RX ADMIN — DORZOLAMIDE HYDROCHLORIDE TIMOLOL MALEATE 1 DROP(S): 20; 5 SOLUTION/ DROPS OPHTHALMIC at 21:36

## 2019-02-27 RX ADMIN — PIPERACILLIN AND TAZOBACTAM 25 GRAM(S): 4; .5 INJECTION, POWDER, LYOPHILIZED, FOR SOLUTION INTRAVENOUS at 21:37

## 2019-02-27 RX ADMIN — Medication 3 MILLILITER(S): at 01:02

## 2019-02-27 RX ADMIN — Medication 400 MILLIGRAM(S): at 21:36

## 2019-02-27 RX ADMIN — ATORVASTATIN CALCIUM 10 MILLIGRAM(S): 80 TABLET, FILM COATED ORAL at 21:36

## 2019-02-27 RX ADMIN — PIPERACILLIN AND TAZOBACTAM 25 GRAM(S): 4; .5 INJECTION, POWDER, LYOPHILIZED, FOR SOLUTION INTRAVENOUS at 13:52

## 2019-02-27 RX ADMIN — Medication 400 MILLIGRAM(S): at 06:07

## 2019-02-27 RX ADMIN — TAMSULOSIN HYDROCHLORIDE 0.4 MILLIGRAM(S): 0.4 CAPSULE ORAL at 21:36

## 2019-02-27 RX ADMIN — DORZOLAMIDE HYDROCHLORIDE TIMOLOL MALEATE 1 DROP(S): 20; 5 SOLUTION/ DROPS OPHTHALMIC at 09:37

## 2019-02-27 RX ADMIN — ENOXAPARIN SODIUM 40 MILLIGRAM(S): 100 INJECTION SUBCUTANEOUS at 09:36

## 2019-02-27 RX ADMIN — PIPERACILLIN AND TAZOBACTAM 25 GRAM(S): 4; .5 INJECTION, POWDER, LYOPHILIZED, FOR SOLUTION INTRAVENOUS at 06:07

## 2019-02-27 NOTE — PROGRESS NOTE ADULT - SUBJECTIVE AND OBJECTIVE BOX
Subjective: No complaints but nursing reports that the patient had episodes of desaturation overnight into the 70's briefly while on the Venti Mask.     MEDICATIONS  (STANDING):  ALBUTerol/ipratropium for Nebulization 3 milliLiter(s) Nebulizer every 6 hours  atorvastatin 10 milliGRAM(s) Oral at bedtime  dorzolamide 2%/timolol 0.5% Ophthalmic Solution 1 Drop(s) Both EYES every 12 hours  enoxaparin Injectable 40 milliGRAM(s) SubCutaneous daily  latanoprost 0.005% Ophthalmic Solution 1 Drop(s) Both EYES at bedtime  pentoxifylline 400 milliGRAM(s) Oral three times a day  piperacillin/tazobactam IVPB. 3.375 Gram(s) IV Intermittent every 8 hours  sodium chloride 0.9%. 1000 milliLiter(s) (75 mL/Hr) IV Continuous <Continuous>  tamsulosin 0.4 milliGRAM(s) Oral at bedtime    MEDICATIONS  (PRN):  morphine  - Injectable 4 milliGRAM(s) IV Push every 4 hours PRN Moderate Pain (4 - 6)  oxyCODONE    IR 5 milliGRAM(s) Oral every 3 hours PRN Mild Pain (1 - 3)  oxyCODONE    IR 10 milliGRAM(s) Oral every 3 hours PRN Moderate Pain (4 - 6)      Allergies    No Known Allergies      Vital Signs Last 24 Hrs  T(C): 36.4 (27 Feb 2019 07:49), Max: 37.6 (26 Feb 2019 11:00)  T(F): 97.5 (27 Feb 2019 07:49), Max: 99.7 (26 Feb 2019 11:00)  HR: 100 (27 Feb 2019 08:06) (93 - 113)  BP: 165/67 (27 Feb 2019 07:49) (126/76 - 178/81)  BP(mean): --  RR: 20 (27 Feb 2019 07:49) (16 - 25)  SpO2: 90% (27 Feb 2019 08:06) (80% - 98%)    PHYSICAL EXAM:  GENERAL: NAD, well-groomed, well-developed  HEAD:  Atraumatic, Normocephalic  EYES: EOMI, PERRLA, conjunctiva and sclera clear  ENMT: Moist mucous membranes, Good dentition, No lesions; No tonsillar erythema, exudates, or enlargement  NECK: Supple, No JVD, Normal thyroid  CHEST/LUNG: B/L Wheezing at lung bases   HEART: Regular rate and rhythm; No murmurs, rubs, or gallops  ABDOMEN: Soft, Nontender, Nondistended; Bowel sounds present  EXTREMITIES:  2+ Peripheral Pulses, No clubbing, cyanosis, or edema    LABS:                        10.1   13.95 )-----------( 193      ( 27 Feb 2019 07:55 )             32.9     27 Feb 2019 07:55    131    |  97     |  21     ----------------------------<  130    4.2     |  26     |  0.54     Ca    7.9        27 Feb 2019 07:55          CAPILLARY BLOOD GLUCOSE          RADIOLOGY & ADDITIONAL TESTS:    Imaging Personally Reviewed:  [ ] YES     Consultant(s) Notes Reviewed:      Care Discussed with Consultants/Other Providers:    Advanced Directives: [ ] DNR  [ ] No feeding tube  [ ] MOLST in chart  [ ] MOLST completed today  [ ] Unknown

## 2019-02-27 NOTE — DIETITIAN INITIAL EVALUATION ADULT. - NS AS NUTRI INTERV MEALS SNACK
Continue with current diet order of "regular". Add 1 can Ensure Enlive TID and 1 can magic cup BID to help meet nutrient needs./General/healthful diet

## 2019-02-27 NOTE — DIETITIAN INITIAL EVALUATION ADULT. - FACTORS AFF FOOD INTAKE
persistent lack of appetite/Pt is experiencing lack of taste, states he has no interest in eating/change in sense of smell or taste

## 2019-02-27 NOTE — PROGRESS NOTE ADULT - ASSESSMENT
89M HTN, HLD, PAD admitted for Right IT Fracture and is post ORIF.    Acute Respiratory Failure  Does not appear to be improving.  Renal function has now improved and so will pursue CT-A to rule out PE.  Procalcitonin elevation not impressive but for now will continue IV Zosyn + IV Vancomycin. Follow up all cultures. Needs aggressive Chest PT, Duonebs changed to PRN based on tachycardia but this could also be due to PE.  Maintain O2 > 92%. Echo done reveals mild diastolic CHF with moderate AS.     Acute Renal Failure  Resolved     Right IT Fracture S/P ORIF POD 3  Related to mechanical Fall.  Continue pain control and bowel regimen.  Incentive spirometer for lung expansion. Work with PT/OT.  Ortho on board. Currently his progress is being held up by his Respiratory Failure.     HTN  Resume Lisinopril today. Hold HCTZ.     Aortic Stenosis  Maintain hemodynamics and volume.  Cardio on board    HLD   Atorvastatin    BPH  Flomax    Diet  Regular    DVT Prophylaxis  Heparin 5000 SC TID     Disposition  Full Code/Inpatient  Discharge planning pending hospital course; 89M HTN, HLD, PAD admitted for Right IT Fracture and is post ORIF.    Acute Respiratory Failure  Does not appear to be improving.  Renal function has now improved and so will pursue CT-A to rule out PE.  Procalcitonin elevation not impressive but for now will continue IV Zosyn + IV Vancomycin. Follow up all cultures. Needs aggressive Chest PT, Duonebs changed to PRN based on tachycardia but this could also be due to PE.  Maintain O2 > 92%. Echo done reveals mild diastolic CHF with moderate AS.     Acute Renal Failure  Resolved     Right IT Fracture S/P ORIF POD 3  Related to mechanical Fall.  Continue pain control and bowel regimen.  Incentive spirometer for lung expansion. Work with PT/OT.  Ortho on board. Currently his progress is being held up by his Respiratory Failure.     HTN  Resume Lisinopril today. Hold HCTZ.     Aortic Stenosis  Maintain hemodynamics and volume.  Cardio on board    HLD   Atorvastatin    BPH  Flomax    Diet  Regular    DVT Prophylaxis  Heparin 5000 SC TID     Disposition  Full Code/Inpatient  Discharge planning pending hospital course;    Addendum    CT-A Reviewed and will stop IVF and start diuresis. Will wean O2.

## 2019-02-27 NOTE — DIETITIAN INITIAL EVALUATION ADULT. - PHYSICAL APPEARANCE
underweight/Pt looks thin and frail, but may be appropriate given elderly status Pt looks thin and frail, but may be appropriate given elderly status. Attempted NFPE but patient adamantly declined./underweight

## 2019-02-27 NOTE — DIETITIAN INITIAL EVALUATION ADULT. - ORAL INTAKE PTA
Pt states he has had lack of taste for many years and he "just doesn't like eating anymore". Able to eat, just feels no desire to. Would not give typical intake at home./poor

## 2019-02-27 NOTE — DIETITIAN INITIAL EVALUATION ADULT. - PERTINENT LABORATORY DATA
2/27: WBC 13.95 <H>, RBC 3.92 <L>, Hgb 10.1 <L>, 32.9 <L>, Na+ 131 <L>, Glu @ 7:55 130 <H>, 2/23: Alb 2.6 <L>

## 2019-02-27 NOTE — DIETITIAN INITIAL EVALUATION ADULT. - OTHER INFO
Pt is a 89yo male admitted from home for right hip pain after a fall on 2/22. Post OIRF, experienced respiratory distress and was put on O2. Dx with mild diastolic CHF and possible pulmonary embolism. PMH of HTN, HLD, peripheral artery disease, arthritis. Pt was somewhat agitated and confused during interview, deflected when asked questions re food intake. States he just wants to die peacefully and doesn't think people should live this long. Children live out of state, wife passed away last august, no one comes to visit him. Lives alone at home. Asked if patient would be willing to consume supplement, he stated he liked Ensure but just wanted to go home. Stressed that food intake would promote nourishment/healing. Asked for food preferences and pt stated he liked ice cream; would not discuss food further. Current diet order is "regular", which pt is tolerating but underconsuming. Will rec adding 1 can Ensure Enlive TID and 1 can magic cup BID to help patient meet nutrient needs. Skin WDL, no edema or pressure injuries noted at this time.

## 2019-02-27 NOTE — PROGRESS NOTE ADULT - SUBJECTIVE AND OBJECTIVE BOX
JAK HERNANDEZ                                                                [id number]                                                     Allergies---No Known Allergies        Pt is a 89y year old Male s/p ORIF right hip, due a fracture.   Pain is 3/10.   Tolerating regular diet.   (+) voids. The patient is A&O x 3, resting comfortably in bed, with no complaints.   Denies any chest pain / shortness of breath / dyspne/ nausea / vomitting / headaches or light headedness.      Vital Signs Last 24 Hrs  T(F): 97.5 (02-27-19 @ 07:49), Max: 97.5 (02-27-19 @ 07:49)  HR: 108 (02-27-19 @ 11:35)  BP: 165/67 (02-27-19 @ 07:49)  RR: 20 (02-27-19 @ 07:49)  SpO2: 78% (02-27-19 @ 11:35)    I&O's Detail    26 Feb 2019 07:01  -  27 Feb 2019 07:00  --------------------------------------------------------  IN:    Oral Fluid: 1115 mL  Total IN: 1115 mL    OUT:    Indwelling Catheter - Urethral: 1500 mL  Total OUT: 1500 mL    Total NET: -385 mL        PE:   Right Lower Extremity:   Dressing is C/D/I.   The dressing was changed with no complications.   The wound is C/D/I.   The wound is closed with steristrips.   NO redness, swelling, heat, discharge or any other evidence of infection superficial or deep is noted.   NVI.   Sensation intact to light touch distally.   No gross evidence of motor deficit.   EHL/FHL/TA intact.   +2 DP/PT pulses.   Toes are pink and mobile.   Capillary refill < 2 seconds.   Negative calf tenderness.   No evidence of impending compartment syndrome.   PAS on.                             10.1   13.95 )--------------( 193                          02-27-19 @ 07:55               32.9       131   |  97  |  21  -----------------------------<  130                  02-27-19 @ 07:55  4.2    |  26    |  0.54        Ca    7.9<L>              A:   Pt is a 89y year old Male S/P ORIF right hip, Post Op Day #4        Plan:    - Follow up with Medicine    - OOB with PT/OT   - DVT ppx = PAS +  JAK HERNANDEZ   - Pain control    - Incentive spirometry   - Labs in A.M.   - Discharge Planning                                                                                                                                                                           Tan STEVENSC

## 2019-02-28 LAB
ANION GAP SERPL CALC-SCNC: 7 MMOL/L — SIGNIFICANT CHANGE UP (ref 5–17)
BUN SERPL-MCNC: 27 MG/DL — HIGH (ref 7–23)
CALCIUM SERPL-MCNC: 8 MG/DL — LOW (ref 8.4–10.5)
CHLORIDE SERPL-SCNC: 101 MMOL/L — SIGNIFICANT CHANGE UP (ref 96–108)
CO2 SERPL-SCNC: 30 MMOL/L — SIGNIFICANT CHANGE UP (ref 22–31)
CREAT SERPL-MCNC: 0.76 MG/DL — SIGNIFICANT CHANGE UP (ref 0.5–1.3)
CULTURE RESULTS: SIGNIFICANT CHANGE UP
GLUCOSE SERPL-MCNC: 125 MG/DL — HIGH (ref 70–99)
HCT VFR BLD CALC: 31.7 % — LOW (ref 39–50)
HGB BLD-MCNC: 10.1 G/DL — LOW (ref 13–17)
MCHC RBC-ENTMCNC: 26.9 PG — LOW (ref 27–34)
MCHC RBC-ENTMCNC: 31.9 GM/DL — LOW (ref 32–36)
MCV RBC AUTO: 84.3 FL — SIGNIFICANT CHANGE UP (ref 80–100)
NRBC # BLD: 0 /100 WBCS — SIGNIFICANT CHANGE UP (ref 0–0)
PLATELET # BLD AUTO: 217 K/UL — SIGNIFICANT CHANGE UP (ref 150–400)
POTASSIUM SERPL-MCNC: 3.7 MMOL/L — SIGNIFICANT CHANGE UP (ref 3.5–5.3)
POTASSIUM SERPL-SCNC: 3.7 MMOL/L — SIGNIFICANT CHANGE UP (ref 3.5–5.3)
RBC # BLD: 3.76 M/UL — LOW (ref 4.2–5.8)
RBC # FLD: 15.5 % — HIGH (ref 10.3–14.5)
SODIUM SERPL-SCNC: 138 MMOL/L — SIGNIFICANT CHANGE UP (ref 135–145)
SPECIMEN SOURCE: SIGNIFICANT CHANGE UP
WBC # BLD: 12.05 K/UL — HIGH (ref 3.8–10.5)
WBC # FLD AUTO: 12.05 K/UL — HIGH (ref 3.8–10.5)

## 2019-02-28 PROCEDURE — 99233 SBSQ HOSP IP/OBS HIGH 50: CPT

## 2019-02-28 RX ORDER — ALPRAZOLAM 0.25 MG
0.5 TABLET ORAL ONCE
Qty: 0 | Refills: 0 | Status: DISCONTINUED | OUTPATIENT
Start: 2019-02-28 | End: 2019-02-28

## 2019-02-28 RX ORDER — LISINOPRIL 2.5 MG/1
10 TABLET ORAL DAILY
Qty: 0 | Refills: 0 | Status: DISCONTINUED | OUTPATIENT
Start: 2019-02-28 | End: 2019-03-07

## 2019-02-28 RX ORDER — HYDROCHLOROTHIAZIDE 25 MG
25 TABLET ORAL DAILY
Qty: 0 | Refills: 0 | Status: DISCONTINUED | OUTPATIENT
Start: 2019-02-28 | End: 2019-03-07

## 2019-02-28 RX ORDER — FUROSEMIDE 40 MG
20 TABLET ORAL ONCE
Qty: 0 | Refills: 0 | Status: COMPLETED | OUTPATIENT
Start: 2019-02-28 | End: 2019-02-28

## 2019-02-28 RX ADMIN — DORZOLAMIDE HYDROCHLORIDE TIMOLOL MALEATE 1 DROP(S): 20; 5 SOLUTION/ DROPS OPHTHALMIC at 21:22

## 2019-02-28 RX ADMIN — Medication 400 MILLIGRAM(S): at 13:56

## 2019-02-28 RX ADMIN — Medication 3 MILLILITER(S): at 10:07

## 2019-02-28 RX ADMIN — PIPERACILLIN AND TAZOBACTAM 25 GRAM(S): 4; .5 INJECTION, POWDER, LYOPHILIZED, FOR SOLUTION INTRAVENOUS at 08:24

## 2019-02-28 RX ADMIN — Medication 25 MILLIGRAM(S): at 13:56

## 2019-02-28 RX ADMIN — PIPERACILLIN AND TAZOBACTAM 25 GRAM(S): 4; .5 INJECTION, POWDER, LYOPHILIZED, FOR SOLUTION INTRAVENOUS at 23:28

## 2019-02-28 RX ADMIN — LATANOPROST 1 DROP(S): 0.05 SOLUTION/ DROPS OPHTHALMIC; TOPICAL at 21:24

## 2019-02-28 RX ADMIN — OXYCODONE HYDROCHLORIDE 5 MILLIGRAM(S): 5 TABLET ORAL at 00:30

## 2019-02-28 RX ADMIN — Medication 0.5 MILLIGRAM(S): at 01:20

## 2019-02-28 RX ADMIN — PIPERACILLIN AND TAZOBACTAM 25 GRAM(S): 4; .5 INJECTION, POWDER, LYOPHILIZED, FOR SOLUTION INTRAVENOUS at 16:32

## 2019-02-28 RX ADMIN — Medication 400 MILLIGRAM(S): at 21:24

## 2019-02-28 RX ADMIN — ATORVASTATIN CALCIUM 10 MILLIGRAM(S): 80 TABLET, FILM COATED ORAL at 21:24

## 2019-02-28 RX ADMIN — LISINOPRIL 10 MILLIGRAM(S): 2.5 TABLET ORAL at 13:56

## 2019-02-28 RX ADMIN — Medication 20 MILLIGRAM(S): at 16:56

## 2019-02-28 RX ADMIN — ENOXAPARIN SODIUM 40 MILLIGRAM(S): 100 INJECTION SUBCUTANEOUS at 08:25

## 2019-02-28 RX ADMIN — TAMSULOSIN HYDROCHLORIDE 0.4 MILLIGRAM(S): 0.4 CAPSULE ORAL at 21:24

## 2019-02-28 RX ADMIN — OXYCODONE HYDROCHLORIDE 5 MILLIGRAM(S): 5 TABLET ORAL at 00:00

## 2019-02-28 RX ADMIN — Medication 400 MILLIGRAM(S): at 06:23

## 2019-02-28 RX ADMIN — DORZOLAMIDE HYDROCHLORIDE TIMOLOL MALEATE 1 DROP(S): 20; 5 SOLUTION/ DROPS OPHTHALMIC at 08:25

## 2019-02-28 NOTE — PROGRESS NOTE ADULT - SUBJECTIVE AND OBJECTIVE BOX
Subjective:  Patient has confusion on and on. Can be redirected. Breathing has improved and he is feeling better. Venti mask has come off and now on 4L of Nasal Canula.     MEDICATIONS  (STANDING):  atorvastatin 10 milliGRAM(s) Oral at bedtime  dorzolamide 2%/timolol 0.5% Ophthalmic Solution 1 Drop(s) Both EYES every 12 hours  enoxaparin Injectable 40 milliGRAM(s) SubCutaneous daily  latanoprost 0.005% Ophthalmic Solution 1 Drop(s) Both EYES at bedtime  pentoxifylline 400 milliGRAM(s) Oral three times a day  piperacillin/tazobactam IVPB. 3.375 Gram(s) IV Intermittent every 8 hours  tamsulosin 0.4 milliGRAM(s) Oral at bedtime    MEDICATIONS  (PRN):  ALBUTerol/ipratropium for Nebulization 3 milliLiter(s) Nebulizer every 6 hours PRN Shortness of Breath  morphine  - Injectable 4 milliGRAM(s) IV Push every 4 hours PRN Moderate Pain (4 - 6)  oxyCODONE    IR 5 milliGRAM(s) Oral every 3 hours PRN Mild Pain (1 - 3)  oxyCODONE    IR 10 milliGRAM(s) Oral every 3 hours PRN Moderate Pain (4 - 6)      Allergies    No Known Allergies    Intolerances        Vital Signs Last 24 Hrs  T(C): 36.4 (28 Feb 2019 07:46), Max: 36.6 (27 Feb 2019 19:49)  T(F): 97.5 (28 Feb 2019 07:46), Max: 97.9 (27 Feb 2019 19:49)  HR: 105 (28 Feb 2019 11:22) (94 - 118)  BP: 127/72 (28 Feb 2019 07:46) (127/72 - 171/74)  BP(mean): --  RR: 20 (28 Feb 2019 07:46) (18 - 20)  SpO2: 91% (28 Feb 2019 11:22) (91% - 97%)    PHYSICAL EXAM:  GENERAL: NAD, well-groomed, well-developed  HEAD:  Atraumatic, Normocephalic  EYES: EOMI, PERRLA, conjunctiva and sclera clear  ENMT: Moist mucous membranes, Good dentition, No lesions; No tonsillar erythema, exudates, or enlargement  NECK: Supple, No JVD, Normal thyroid  NERVOUS SYSTEM:  Alert & Oriented X3, Good concentration; All 4 extremities mobile, no gross sensory deficits.   CHEST/LUNG: Clear to auscultation bilaterally; No rales, rhonchi, wheezing, or rubs  HEART: Regular rate and rhythm; No murmurs, rubs, or gallops  ABDOMEN: Soft, Nontender, Nondistended; Bowel sounds present  EXTREMITIES:  2+ Peripheral Pulses, No clubbing, cyanosis, or edema  LYMPH: No lymphadenopathy noted  SKIN: No rashes or lesions    LABS:      Ca    7.9        27 Feb 2019 07:55          CAPILLARY BLOOD GLUCOSE          RADIOLOGY & ADDITIONAL TESTS:    Imaging Personally Reviewed:  [ ] YES     Consultant(s) Notes Reviewed:      Care Discussed with Consultants/Other Providers:    Advanced Directives: [ ] DNR  [ ] No feeding tube  [ ] MOLST in chart  [ ] MOLST completed today  [ ] Unknown

## 2019-02-28 NOTE — PROGRESS NOTE ADULT - ASSESSMENT
89M HTN, HLD, PAD admitted for Right IT Fracture and is post ORIF.    Acute Respiratory Failure  Secondary to volume overload and possible PNA from atelectasis. Improving with diuresis + Abx. Output >1L over past 24 hours. Will repeat CXR in AM. PE workup negative on CTA.  Follow up all cultures. Instructed patient and staff that he needs aggressive Chest PT, and mobility to do better. Duonebs changed to PRN.  Maintain O2 > 92%. Echo done reveals mild diastolic CHF with moderate AS.     Acute Renal Failure  Resolved     Right IT Fracture S/P ORIF POD 4  Related to mechanical Fall.  Continue pain control and bowel regimen.  Incentive spirometer for lung expansion. Work with PT/OT.  Ortho on board. Currently his progress is being held up by his Respiratory Failure but I have instructed for more agressive PT.     HTN  Resume Lisinopril today. Hold HCTZ.     Aortic Stenosis  Maintain hemodynamics and volume.  Cardio on board    HLD   Atorvastatin    BPH  Flomax    Diet  Regular    DVT Prophylaxis  Heparin 5000 SC TID     Disposition  Full Code/Inpatient  Discharge planning pending hospital course; 89M HTN, HLD, PAD admitted for Right IT Fracture and is post ORIF.    Acute Respiratory Failure  Secondary to volume overload and possible PNA from atelectasis. Improving with diuresis + Abx. Output >1L over past 24 hours. 20mg IV Lasix x 1. Will repeat CXR in AM. PE workup negative on CTA.  Follow up all cultures. Instructed patient and staff that he needs aggressive Chest PT, and mobility to do better. Duonebs changed to PRN.  Maintain O2 > 92%. Echo done reveals mild diastolic CHF with moderate AS.     Right IT Fracture S/P ORIF POD 4  Related to mechanical Fall.  Continue pain control and bowel regimen.  Incentive spirometer for lung expansion. Work with PT/OT.  Ortho on board. Currently his progress is being held up by his Respiratory Failure but I have instructed for more agressive PT.     HTN  Resume Lisinopril + HCTZ    Aortic Stenosis  Maintain hemodynamics and volume.  Cardio on board    HLD   Atorvastatin    BPH  Flomax    Diet  Regular    DVT Prophylaxis  Heparin 5000 SC TID     Disposition  Full Code/Inpatient  Discharge planning pending hospital course;

## 2019-02-28 NOTE — PROGRESS NOTE ADULT - SUBJECTIVE AND OBJECTIVE BOX
Post Op Day # 5    SUBJECTIVE    89yo Male status post ORIF Right Hip.   Patient is alert and comfortable.    Pain is controlled with current pain regimen.  No new complaints.    OBJECTIVE    Vital Signs Last 24 Hrs  T(C): 36.4 (28 Feb 2019 07:46), Max: 36.6 (27 Feb 2019 19:49)  T(F): 97.5 (28 Feb 2019 07:46), Max: 97.9 (27 Feb 2019 19:49)  HR: 94 (28 Feb 2019 07:46) (94 - 118)  BP: 127/72 (28 Feb 2019 07:46) (127/72 - 171/74)  BP(mean): --  RR: 20 (28 Feb 2019 07:46) (18 - 20)  SpO2: 94% (28 Feb 2019 07:46) (78% - 96%)  I&O's Summary    27 Feb 2019 07:01  -  28 Feb 2019 07:00  --------------------------------------------------------  IN: 0 mL / OUT: 2400 mL / NET: -2400 mL        PHYSICAL EXAM    Right Hip incision  is clean, dry and intact.   No erythema/ No exudate/ No blistering/ No ecchymosis.   The calf is supple/nontender.   Passive range of motion is acceptable to due postoperative pain.   Sensation to light touch is grossly intact distally.   The lateral cutaneous nerve is intact.   Motor function distally is intact.   No foot drop.   (2+) dorsalis pedis pulse. Capillary refill is less than 2 seconds. No cyanosis.                          10.1<L>  13.95<H> )-----------( 193      ( 27 Feb 2019 07:55 )             32.9<L>  27 Feb 2019 07:55    27 Feb 2019 07:55    131<L>  |  97     |  21     ----------------------------<  130<H>  4.2     |  26     |  0.54     Ca    7.9<L>      27 Feb 2019 07:55        ASSESSMENT AND PLAN    - Orthopedically stable  - Currently on Zosyn  - DVT prophylaxis: PAS + Lovenox  - Continue physical therapy and occupational therapy  - Weight bearing as tolerated of the right lower extremity with assistance of a walker  - Incentive spirometry encouraged  - Pain control as clinically indicated  - Disposition: subacute rehabilitation

## 2019-03-01 LAB
ANION GAP SERPL CALC-SCNC: 6 MMOL/L — SIGNIFICANT CHANGE UP (ref 5–17)
BUN SERPL-MCNC: 20 MG/DL — SIGNIFICANT CHANGE UP (ref 7–23)
CALCIUM SERPL-MCNC: 7.7 MG/DL — LOW (ref 8.4–10.5)
CHLORIDE SERPL-SCNC: 99 MMOL/L — SIGNIFICANT CHANGE UP (ref 96–108)
CO2 SERPL-SCNC: 32 MMOL/L — HIGH (ref 22–31)
CREAT SERPL-MCNC: 0.68 MG/DL — SIGNIFICANT CHANGE UP (ref 0.5–1.3)
GLUCOSE SERPL-MCNC: 97 MG/DL — SIGNIFICANT CHANGE UP (ref 70–99)
HCT VFR BLD CALC: 33.2 % — LOW (ref 39–50)
HGB BLD-MCNC: 10.3 G/DL — LOW (ref 13–17)
MCHC RBC-ENTMCNC: 25.6 PG — LOW (ref 27–34)
MCHC RBC-ENTMCNC: 31 GM/DL — LOW (ref 32–36)
MCV RBC AUTO: 82.6 FL — SIGNIFICANT CHANGE UP (ref 80–100)
NRBC # BLD: 0 /100 WBCS — SIGNIFICANT CHANGE UP (ref 0–0)
PLATELET # BLD AUTO: 262 K/UL — SIGNIFICANT CHANGE UP (ref 150–400)
POTASSIUM SERPL-MCNC: 3.5 MMOL/L — SIGNIFICANT CHANGE UP (ref 3.5–5.3)
POTASSIUM SERPL-SCNC: 3.5 MMOL/L — SIGNIFICANT CHANGE UP (ref 3.5–5.3)
RBC # BLD: 4.02 M/UL — LOW (ref 4.2–5.8)
RBC # FLD: 15.6 % — HIGH (ref 10.3–14.5)
SODIUM SERPL-SCNC: 137 MMOL/L — SIGNIFICANT CHANGE UP (ref 135–145)
WBC # BLD: 11.39 K/UL — HIGH (ref 3.8–10.5)
WBC # FLD AUTO: 11.39 K/UL — HIGH (ref 3.8–10.5)

## 2019-03-01 PROCEDURE — 71045 X-RAY EXAM CHEST 1 VIEW: CPT | Mod: 26

## 2019-03-01 PROCEDURE — 99233 SBSQ HOSP IP/OBS HIGH 50: CPT

## 2019-03-01 RX ORDER — FUROSEMIDE 40 MG
20 TABLET ORAL ONCE
Qty: 0 | Refills: 0 | Status: COMPLETED | OUTPATIENT
Start: 2019-03-01 | End: 2019-03-01

## 2019-03-01 RX ADMIN — ENOXAPARIN SODIUM 40 MILLIGRAM(S): 100 INJECTION SUBCUTANEOUS at 08:29

## 2019-03-01 RX ADMIN — TAMSULOSIN HYDROCHLORIDE 0.4 MILLIGRAM(S): 0.4 CAPSULE ORAL at 21:03

## 2019-03-01 RX ADMIN — Medication 400 MILLIGRAM(S): at 05:50

## 2019-03-01 RX ADMIN — ATORVASTATIN CALCIUM 10 MILLIGRAM(S): 80 TABLET, FILM COATED ORAL at 21:03

## 2019-03-01 RX ADMIN — Medication 20 MILLIGRAM(S): at 12:11

## 2019-03-01 RX ADMIN — PIPERACILLIN AND TAZOBACTAM 25 GRAM(S): 4; .5 INJECTION, POWDER, LYOPHILIZED, FOR SOLUTION INTRAVENOUS at 17:22

## 2019-03-01 RX ADMIN — LISINOPRIL 10 MILLIGRAM(S): 2.5 TABLET ORAL at 05:50

## 2019-03-01 RX ADMIN — PIPERACILLIN AND TAZOBACTAM 25 GRAM(S): 4; .5 INJECTION, POWDER, LYOPHILIZED, FOR SOLUTION INTRAVENOUS at 08:29

## 2019-03-01 RX ADMIN — Medication 25 MILLIGRAM(S): at 05:50

## 2019-03-01 RX ADMIN — LATANOPROST 1 DROP(S): 0.05 SOLUTION/ DROPS OPHTHALMIC; TOPICAL at 21:02

## 2019-03-01 RX ADMIN — Medication 400 MILLIGRAM(S): at 12:05

## 2019-03-01 RX ADMIN — DORZOLAMIDE HYDROCHLORIDE TIMOLOL MALEATE 1 DROP(S): 20; 5 SOLUTION/ DROPS OPHTHALMIC at 21:02

## 2019-03-01 RX ADMIN — Medication 400 MILLIGRAM(S): at 21:03

## 2019-03-01 RX ADMIN — DORZOLAMIDE HYDROCHLORIDE TIMOLOL MALEATE 1 DROP(S): 20; 5 SOLUTION/ DROPS OPHTHALMIC at 12:05

## 2019-03-01 NOTE — PROGRESS NOTE ADULT - ASSESSMENT
89M HTN, HLD, PAD and Moderate Aortic Stenosis admitted for Right IT Fracture and is post ORIF.    Acute Respiratory Failure  Secondary to volume overload and possible PNA from atelectasis. Significant Improvement with diuresis + Abx (Day3) and now on 1-2L NC.  Output another 1L over past 24 hours. 40mg IV Lasix x 1. Will repeat CXR in AM.  PE workup negative on CTA.  Follow up all cultures. Instructed patient and staff that he needs aggressive Chest PT, and mobility to do better. Duonebs changed to PRN.  Maintain O2 > 92%. Echo done reveals mild diastolic CHF with moderate AS.     Right IT Fracture S/P ORIF POD 5  Related to mechanical Fall.  Continue pain control and bowel regimen.  Incentive spirometer for lung expansion. Work with PT/OT.  Ortho on board. His progress was held up by his Respiratory Failure but that has now almost resolved and  I have instructed for more aggressive PT.     HTN  Lisinopril + HCTZ    Aortic Stenosis  Maintain hemodynamics and volume.  Cardio on board    HLD   Atorvastatin    BPH  Flomax    Diet  Regular    DVT Prophylaxis  Heparin 5000 SC TID     Disposition  Full Code/Inpatient  Discharge will be to rehab hopefully in 1-2 days as the patients respiratory has markedly improved with diuresis.

## 2019-03-02 DIAGNOSIS — J90 PLEURAL EFFUSION, NOT ELSEWHERE CLASSIFIED: ICD-10-CM

## 2019-03-02 DIAGNOSIS — R93.89 ABNORMAL FINDINGS ON DIAGNOSTIC IMAGING OF OTHER SPECIFIED BODY STRUCTURES: ICD-10-CM

## 2019-03-02 LAB
ANION GAP SERPL CALC-SCNC: 4 MMOL/L — LOW (ref 5–17)
BUN SERPL-MCNC: 18 MG/DL — SIGNIFICANT CHANGE UP (ref 7–23)
CALCIUM SERPL-MCNC: 7.8 MG/DL — LOW (ref 8.4–10.5)
CHLORIDE SERPL-SCNC: 101 MMOL/L — SIGNIFICANT CHANGE UP (ref 96–108)
CO2 SERPL-SCNC: 32 MMOL/L — HIGH (ref 22–31)
CREAT SERPL-MCNC: 0.68 MG/DL — SIGNIFICANT CHANGE UP (ref 0.5–1.3)
GLUCOSE SERPL-MCNC: 100 MG/DL — HIGH (ref 70–99)
HCT VFR BLD CALC: 31.7 % — LOW (ref 39–50)
HGB BLD-MCNC: 10 G/DL — LOW (ref 13–17)
MCHC RBC-ENTMCNC: 26.5 PG — LOW (ref 27–34)
MCHC RBC-ENTMCNC: 31.5 GM/DL — LOW (ref 32–36)
MCV RBC AUTO: 84.1 FL — SIGNIFICANT CHANGE UP (ref 80–100)
NRBC # BLD: 0 /100 WBCS — SIGNIFICANT CHANGE UP (ref 0–0)
PLATELET # BLD AUTO: 289 K/UL — SIGNIFICANT CHANGE UP (ref 150–400)
POTASSIUM SERPL-MCNC: 3.6 MMOL/L — SIGNIFICANT CHANGE UP (ref 3.5–5.3)
POTASSIUM SERPL-SCNC: 3.6 MMOL/L — SIGNIFICANT CHANGE UP (ref 3.5–5.3)
RBC # BLD: 3.77 M/UL — LOW (ref 4.2–5.8)
RBC # FLD: 15.7 % — HIGH (ref 10.3–14.5)
SODIUM SERPL-SCNC: 137 MMOL/L — SIGNIFICANT CHANGE UP (ref 135–145)
WBC # BLD: 13.18 K/UL — HIGH (ref 3.8–10.5)
WBC # FLD AUTO: 13.18 K/UL — HIGH (ref 3.8–10.5)

## 2019-03-02 PROCEDURE — 99232 SBSQ HOSP IP/OBS MODERATE 35: CPT

## 2019-03-02 PROCEDURE — 71045 X-RAY EXAM CHEST 1 VIEW: CPT | Mod: 26

## 2019-03-02 RX ORDER — FUROSEMIDE 40 MG
20 TABLET ORAL DAILY
Qty: 0 | Refills: 0 | Status: DISCONTINUED | OUTPATIENT
Start: 2019-03-02 | End: 2019-03-07

## 2019-03-02 RX ORDER — ALBUTEROL 90 UG/1
2.5 AEROSOL, METERED ORAL EVERY 8 HOURS
Qty: 0 | Refills: 0 | Status: DISCONTINUED | OUTPATIENT
Start: 2019-03-02 | End: 2019-03-07

## 2019-03-02 RX ADMIN — DORZOLAMIDE HYDROCHLORIDE TIMOLOL MALEATE 1 DROP(S): 20; 5 SOLUTION/ DROPS OPHTHALMIC at 21:44

## 2019-03-02 RX ADMIN — LATANOPROST 1 DROP(S): 0.05 SOLUTION/ DROPS OPHTHALMIC; TOPICAL at 21:44

## 2019-03-02 RX ADMIN — PIPERACILLIN AND TAZOBACTAM 25 GRAM(S): 4; .5 INJECTION, POWDER, LYOPHILIZED, FOR SOLUTION INTRAVENOUS at 08:15

## 2019-03-02 RX ADMIN — Medication 20 MILLIGRAM(S): at 12:50

## 2019-03-02 RX ADMIN — Medication 25 MILLIGRAM(S): at 05:02

## 2019-03-02 RX ADMIN — ENOXAPARIN SODIUM 40 MILLIGRAM(S): 100 INJECTION SUBCUTANEOUS at 08:16

## 2019-03-02 RX ADMIN — PIPERACILLIN AND TAZOBACTAM 25 GRAM(S): 4; .5 INJECTION, POWDER, LYOPHILIZED, FOR SOLUTION INTRAVENOUS at 00:56

## 2019-03-02 RX ADMIN — PIPERACILLIN AND TAZOBACTAM 25 GRAM(S): 4; .5 INJECTION, POWDER, LYOPHILIZED, FOR SOLUTION INTRAVENOUS at 23:46

## 2019-03-02 RX ADMIN — DORZOLAMIDE HYDROCHLORIDE TIMOLOL MALEATE 1 DROP(S): 20; 5 SOLUTION/ DROPS OPHTHALMIC at 10:33

## 2019-03-02 RX ADMIN — LISINOPRIL 10 MILLIGRAM(S): 2.5 TABLET ORAL at 05:02

## 2019-03-02 RX ADMIN — TAMSULOSIN HYDROCHLORIDE 0.4 MILLIGRAM(S): 0.4 CAPSULE ORAL at 21:44

## 2019-03-02 RX ADMIN — Medication 400 MILLIGRAM(S): at 05:02

## 2019-03-02 RX ADMIN — PIPERACILLIN AND TAZOBACTAM 25 GRAM(S): 4; .5 INJECTION, POWDER, LYOPHILIZED, FOR SOLUTION INTRAVENOUS at 17:01

## 2019-03-02 RX ADMIN — Medication 400 MILLIGRAM(S): at 21:44

## 2019-03-02 RX ADMIN — Medication 400 MILLIGRAM(S): at 13:42

## 2019-03-02 RX ADMIN — ATORVASTATIN CALCIUM 10 MILLIGRAM(S): 80 TABLET, FILM COATED ORAL at 21:44

## 2019-03-02 NOTE — CONSULT NOTE ADULT - PROBLEM SELECTOR RECOMMENDATION 9
ct chest shows effusions and atelectasis and poss pulm mass left side - no immediate intervention for left mass  effusions - will arrange for IR thoracentesis - discussed with pt and son  HFpEF - cont DIURESIS  I elpidio  Advanced COPD / Emphysema - Albuterol NEBS atc - I elpidio - o2 support   monitor vs and HD and Sat - keep sat > 88 pct  PT as tolerated  CVS regimen and optimization   will follow

## 2019-03-02 NOTE — CONSULT NOTE ADULT - SUBJECTIVE AND OBJECTIVE BOX
Date/Time Patient Seen:  		  Referring MD:   Data Reviewed	       Patient is a 89y old  Male who presents with a chief complaint of right hip pain after a fall (02 Mar 2019 11:25)      Subjective/HPI  in chair  seen and examined  vs and meds reviewed  H and P reviewed  labs reviewed  ct chest shows poss left pulm mass  ct chest shows bilateral effusions    on o2 support    History and Physical:   Source of Information	Patient  Outpatient Providers	Sonya - PMNIDIA, Mayte - vascular     Language:  · Patient/Family of Limited English Proficiency	No       History of Present Illness:  Reason for Admission: right hip pain after a fall  History of Present Illness:   89M with HTN, HLD, peripheral artery disease, arthritis, who presents with right hip pain after a fall.  Patient was getting ready to eat his dinner when he slipped and landed on his right gluteus/hip.  Patient said he was wearing slippers that had no traction.  Denied any head trauma or lost of consciousness.  Denied any chest pain, SOB, palpitaitons right before fall.  Patient was brought here to the ED where he was found to have a right intertrochanteric fracture.  Prior to the fall, patient has been in his usual state of health.  Does complain of chronic arthritic knee pain, intermittent constipation, and recent swelling of BLE for the past 2 months.  Denied any recent fevers, cough, diarrhea.  Orthopedics was consulted and recommended surgery.        HISTORY OF PRESENT ILLNESS:    High Risk Travel:  International Travel? No(1)    · Chief Complaint: The patient is a 89y Male complaining of fall.  · HPI Objective Statement: 89 y/o male presents to ED c/o right hip pain s/p slip and fall x 2 hours ago. Rates pain 8/10, worse with movement, no radiation of pain, sudden onset. Pt states he takes daily aspirin 81 mg. Denies LOC. Denies head trauma. Denies any other complaints. States he otherwise feels good. Denies n/v, f/c, chest pain, sob, numbness, tingling, headache, lightheadedness, dizziness, visual changes, abdominal pain, back pain.  · Presenting Symptoms: DEFORMITY, PAIN  · Negative Findings: no abrasion, no bleeding, no confusion, no fever, no loss of consciousness, no numbness, no tingling, no vomiting, no weakness  · Location: hip  · Laterality: right  · Duration: hour(s)  x2  · Quality: sharp  · Severity: PAIN SCALE 8 OF 10.  · Fall Cause: slipping, stumbling. tripping  · Incident Location: home  · Aggravated Factors: movement  · Relieving Factors: rest       PAST MEDICAL & SURGICAL HISTORY:  Peripheral artery disease  Arthritis  Hyperlipidemia, unspecified hyperlipidemia type  Essential hypertension  History of back surgery: not spinal surgery, possibly lipoma?  H/O hernia repair        Medication list         MEDICATIONS  (STANDING):  atorvastatin 10 milliGRAM(s) Oral at bedtime  dorzolamide 2%/timolol 0.5% Ophthalmic Solution 1 Drop(s) Both EYES every 12 hours  enoxaparin Injectable 40 milliGRAM(s) SubCutaneous daily  furosemide    Tablet 20 milliGRAM(s) Oral daily  hydrochlorothiazide 25 milliGRAM(s) Oral daily  latanoprost 0.005% Ophthalmic Solution 1 Drop(s) Both EYES at bedtime  lisinopril 10 milliGRAM(s) Oral daily  pentoxifylline 400 milliGRAM(s) Oral three times a day  piperacillin/tazobactam IVPB. 3.375 Gram(s) IV Intermittent every 8 hours  tamsulosin 0.4 milliGRAM(s) Oral at bedtime    MEDICATIONS  (PRN):  ALBUTerol/ipratropium for Nebulization 3 milliLiter(s) Nebulizer every 6 hours PRN Shortness of Breath  morphine  - Injectable 4 milliGRAM(s) IV Push every 4 hours PRN Moderate Pain (4 - 6)  oxyCODONE    IR 5 milliGRAM(s) Oral every 3 hours PRN Mild Pain (1 - 3)  oxyCODONE    IR 10 milliGRAM(s) Oral every 3 hours PRN Moderate Pain (4 - 6)         Vitals log        ICU Vital Signs Last 24 Hrs  T(C): 36.6 (02 Mar 2019 08:08), Max: 36.7 (01 Mar 2019 15:11)  T(F): 97.8 (02 Mar 2019 08:08), Max: 98.1 (01 Mar 2019 15:11)  HR: 96 (02 Mar 2019 08:08) (79 - 96)  BP: 118/60 (02 Mar 2019 08:08) (106/59 - 130/66)  BP(mean): --  ABP: --  ABP(mean): --  RR: 18 (02 Mar 2019 08:08) (18 - 18)  SpO2: 97% (02 Mar 2019 08:08) (97% - 98%)           Input and Output:  I&O's Detail    01 Mar 2019 07:01  -  02 Mar 2019 07:00  --------------------------------------------------------  IN:  Total IN: 0 mL    OUT:    Indwelling Catheter - Urethral: 3000 mL  Total OUT: 3000 mL    Total NET: -3000 mL          Lab Data                        10.0   13.18 )-----------( 289      ( 02 Mar 2019 07:59 )             31.7     03-02    137  |  101  |  18  ----------------------------<  100<H>  3.6   |  32<H>  |  0.68    Ca    7.8<L>      02 Mar 2019 07:59      Past Surgical History:  H/O hernia repair    History of back surgery  not spinal surgery, possibly lipoma?     Family History:  Family history of colon cancer, twin brother with colon CA     Mother  Still living? Unknown  Family history of MI (myocardial infarction), Age at diagnosis: Age Unknown.     Social History:  Social History (marital status, living situation, occupation, tobacco use, alcohol and drug use, and sexual history): + former heavy smoker (quit about 19 years ago, was a 2 ppk for 50 yrs)  	+ very rare etoh use (approx 1-2 beer/month)  	- denies any illegal drug use  - lives alone     Tobacco Screening:  · Core Measure Site	No  · Has the patient used tobacco in the past 30 days?	No          Review of Systems	  sob  pinedo      Objective     Physical Examination    heart s1s2  lung dec BS  abd soft      Pertinent Lab findings & Imaging      Berry:  NO   Adequate UO     I&O's Detail    01 Mar 2019 07:01  -  02 Mar 2019 07:00  --------------------------------------------------------  IN:  Total IN: 0 mL    OUT:    Indwelling Catheter - Urethral: 3000 mL  Total OUT: 3000 mL    Total NET: -3000 mL               Discussed with:     Cultures:	        Radiology

## 2019-03-02 NOTE — PROGRESS NOTE ADULT - ASSESSMENT
89M HTN, HLD, PAD and Moderate Aortic Stenosis admitted for Right IT Fracture and is post ORIF.    Acute Respiratory Failure  Secondary to volume overload and possible PNA from atelectasis. Significant Improvement with diuresis + Abx   -Repeat CXR in AM.    -PE workup negative on CTA.    -Blood culture negative  -Echo done reveals mild diastolic CHF with moderate AS.   -Continue with small dose of lasix  - Pulmonary consult  -Cardiology to follow up       Right IT Fracture S/P ORIF   Continue with pain management, DVT proph, and wound care as per Ortho.  PT/OT       HTN  Lisinopril + HCTZ    Aortic Stenosis  Maintain hemodynamics and volume.  Cardio to follow up     HLD   Atorvastatin    BPH  Flomax    Diet  Regular    DVT Prophylaxis  Heparin 5000 SC TID     Disposition  Full Code/Inpatient  Discharge will be to rehab hopefully in 1-2 days as the patients respiratory has markedly improved with diuresis. 89M HTN, HLD, PAD and Moderate Aortic Stenosis admitted for Right IT Fracture and is post ORIF.    Acute Respiratory Failure  Secondary to volume overload and possible PNA from atelectasis. Significant Improvement with diuresis + Abx   -Repeat CXR in AM.    -PE workup negative on CTA.    -Blood culture negative  -Echo done reveals mild diastolic CHF with moderate AS.   -Continue with small dose of lasix  - Pulmonary consult  -Cardiology to follow up       Right IT Fracture S/P ORIF   Continue with pain management, DVT proph, and wound care as per Ortho.  PT/OT       HTN  Lisinopril + HCTZ    Aortic Stenosis  Maintain hemodynamics and volume.  Cardio to follow up     HLD   Atorvastatin    BPH  Flomax    Diet  Regular    DVT Prophylaxis  Heparin 5000 SC TID     Lung mass  Noticed on Chest CT scan suspicious for malignancy   Pulmonary consult  -Discussed case with son Vicente Fairchild who recommended to hold off on any intervention at this time.  Will follow up as outpatient with PMD    Disposition  Full Code/Inpatient  Discharge will be to rehab hopefully in 1-2 days as the patients respiratory has markedly improved with diuresis.

## 2019-03-02 NOTE — PROGRESS NOTE ADULT - SUBJECTIVE AND OBJECTIVE BOX
PCP:    REQUESTING PHYSICIAN:    REASON FOR CONSULT:    CHIEF COMPLAINT:    HPI:  89 year old man with a history of HTN, HLD, moderate aortic and mitral stenoses, peripheral artery disease, and arthritis admitted  with hip fracture s/p fall and now POD # 1 ORIF right hip.    19:  Mild nausea; denies: dyspnea, angina, palpitations  3/2/19 Asked to reevaluate hemodynamic status after treatment for PNA and diuretic therapy. Pt denies chest pain or dyspnea at rest or with exertion. Meds were reviewed. NSR without arrhythmia    PAST MEDICAL & SURGICAL HISTORY:  Peripheral artery disease  Arthritis  Hyperlipidemia, unspecified hyperlipidemia type  Essential hypertension  History of back surgery: not spinal surgery, possibly lipoma?  H/O hernia repair      SOCIAL HISTORY:    FAMILY HISTORY:  Family history of colon cancer: twin brother with colon CA  Family history of MI (myocardial infarction) (Mother): at age 72      ALLERGIES:  Allergies    No Known Allergies    Intolerances        MEDICATIONS:    MEDICATIONS  (STANDING):  ALBUTerol    0.083% 2.5 milliGRAM(s) Nebulizer every 8 hours  atorvastatin 10 milliGRAM(s) Oral at bedtime  dorzolamide 2%/timolol 0.5% Ophthalmic Solution 1 Drop(s) Both EYES every 12 hours  enoxaparin Injectable 40 milliGRAM(s) SubCutaneous daily  furosemide    Tablet 20 milliGRAM(s) Oral daily  hydrochlorothiazide 25 milliGRAM(s) Oral daily  latanoprost 0.005% Ophthalmic Solution 1 Drop(s) Both EYES at bedtime  lisinopril 10 milliGRAM(s) Oral daily  pentoxifylline 400 milliGRAM(s) Oral three times a day  piperacillin/tazobactam IVPB. 3.375 Gram(s) IV Intermittent every 8 hours  tamsulosin 0.4 milliGRAM(s) Oral at bedtime    MEDICATIONS  (PRN):  ALBUTerol/ipratropium for Nebulization 3 milliLiter(s) Nebulizer every 6 hours PRN Shortness of Breath  morphine  - Injectable 4 milliGRAM(s) IV Push every 4 hours PRN Moderate Pain (4 - 6)  oxyCODONE    IR 5 milliGRAM(s) Oral every 3 hours PRN Mild Pain (1 - 3)  oxyCODONE    IR 10 milliGRAM(s) Oral every 3 hours PRN Moderate Pain (4 - 6)        Vital Signs Last 24 Hrs  T(C): 36.9 (02 Mar 2019 15:00), Max: 36.9 (02 Mar 2019 15:00)  T(F): 98.5 (02 Mar 2019 15:00), Max: 98.5 (02 Mar 2019 15:00)  HR: 60 (02 Mar 2019 15:00) (60 - 96)  BP: 164/65 (02 Mar 2019 15:00) (118/60 - 164/65)  BP(mean): --  RR: 16 (02 Mar 2019 15:) (16 - 18)  SpO2: 99% (02 Mar 2019 15:) (97% - 99%)Daily     Daily Weight in k.2 (02 Mar 2019 05:53)I&O's Summary    01 Mar 2019 07:01  -  02 Mar 2019 07:00  --------------------------------------------------------  IN: 0 mL / OUT: 3000 mL / NET: -3000 mL    02 Mar 2019 07:01  -  02 Mar 2019 17:51  --------------------------------------------------------  IN: 0 mL / OUT: 600 mL / NET: -600 mL        PHYSICAL EXAM:    Constitutional: NAD, awake and alert, well-developed  HEENT: PERR, EOMI,  No oral cyananosis.  Neck:  supple,  No JVD  Respiratory: Breath sounds are clear bilaterally, No wheezing, rales or rhonchi  Cardiovascular: S1 and S2, regular rate and rhythm, no Murmurs, gallops or rubs  Gastrointestinal: Bowel Sounds present, soft, nontender.   Extremities: No peripheral edema. No clubbing or cyanosis.  Vascular: 2+ peripheral pulses  Neurological: A/O x 3, no focal deficits  Musculoskeletal: no calf tenderness.  Skin: No rashes.      LABS: All Labs Reviewed:                        10.0   13.18 )-----------( 289      ( 02 Mar 2019 07:59 )             31.7                         10.3   11.39 )-----------( 262      ( 01 Mar 2019 08:03 )             33.2                         10.1   12.05 )-----------( 217      ( 2019 15:37 )             31.7     02 Mar 2019 07:59    137    |  101    |  18     ----------------------------<  100    3.6     |  32     |  0.68   01 Mar 2019 08:03    137    |  99     |  20     ----------------------------<  97     3.5     |  32     |  0.68   2019 15:37    138    |  101    |  27     ----------------------------<  125    3.7     |  30     |  0.76     Ca    7.8        02 Mar 2019 07:59  Ca    7.7        01 Mar 2019 08:03  Ca    8.0        2019 15:37            Blood Culture: Organism --  Gram Stain Blood -- Gram Stain   Few Squamous epithelial cells per low power field  Few polymorphonuclear leukocytes per low power field  Few Gram variable coccobacilli per oil power field  Specimen Source .Sputum Sputum  Culture-Blood --    Organism --  Gram Stain Blood -- Gram Stain --  Specimen Source .Blood Blood  Culture-Blood --            RADIOLOGY/EKG:< from: 12 Lead ECG (19 @ 23:41) >  Diagnosis Line Sinus rhythm with 1st degree AV block  Prolonged QT  Abnormal ECG  No previous ECGs available  Confirmed by Fabrizio Cole MD ([id number]) on 2019 8:44:52 AM    < end of copied text >        ECHO/CARDIAC CATHTERIZATION/STRESS TEST:< from: US Transthoracic Echocardiogram w/Doppler Complete (19 @ 08:38) >  CONCLUSIONS:  1. Technically very limited study with poor acoustic windows.  2. The left ventricle was seen in a very limited fashion in the subcostal   window only.  Grossly normal left ventricular size and overall systolic   function. LVEF estimated at 60% (visual assessment).  Mild diastolic   dysfunction (stage I).  3. Moderate mitral stenosis.  4. Moderate aortic stenosis.                  VENICE MAI M.D., ATTENDING CARDIOLOGIST  This document has been electronically signed. 2019  9:29AM        < end of copied text >

## 2019-03-02 NOTE — PROGRESS NOTE ADULT - SUBJECTIVE AND OBJECTIVE BOX
Post Op Day # 7    SUBJECTIVE    89yo Male status post ORIF Right Hip .   Patient is alert and comfortable.    Pain is controlled with current pain regimen.  Denies nausea, vomiting, chest pain, shortness of breath, abdominal pain or fever.   No new complaints.    OBJECTIVE    Vital Signs Last 24 Hrs  T(C): 36.6 (02 Mar 2019 08:08), Max: 36.7 (01 Mar 2019 15:11)  T(F): 97.8 (02 Mar 2019 08:08), Max: 98.1 (01 Mar 2019 15:11)  HR: 96 (02 Mar 2019 08:08) (79 - 96)  BP: 118/60 (02 Mar 2019 08:08) (106/59 - 130/66)  BP(mean): --  RR: 18 (02 Mar 2019 08:08) (18 - 18)  SpO2: 97% (02 Mar 2019 08:08) (95% - 98%)  I&O's Summary    01 Mar 2019 07:01  -  02 Mar 2019 07:00  --------------------------------------------------------  IN: 0 mL / OUT: 3000 mL / NET: -3000 mL        PHYSICAL EXAM    Right Hip incision is clean, dry and intact.   No erythema/ No exudate/ No blistering/ No ecchymosis.   The calf is supple/nontender.   Passive range of motion is acceptable to due postoperative pain.   Sensation to light touch is grossly intact distally.   The lateral cutaneous nerve is intact.   Motor function distally is intact.   No foot drop.   (2+) dorsalis pedis pulse. Capillary refill is less than 2 seconds. No cyanosis.                          10.0<L>  13.18<H> )-----------( 289      ( 02 Mar 2019 07:59 )             31.7<L>  02 Mar 2019 07:59                        10.3<L>  11.39<H> )-----------( 262      ( 01 Mar 2019 08:03 )             33.2<L>  01 Mar 2019 08:03    02 Mar 2019 07:59    137    |  101    |  18     ----------------------------<  100<H>  3.6     |  32<H>  |  0.68   01 Mar 2019 08:03    137    |  99     |  20     ----------------------------<  97     3.5     |  32<H>  |  0.68     Ca    7.8<L>      02 Mar 2019 07:59  Ca    7.7<L>      01 Mar 2019 08:03        ASSESSMENT AND PLAN    - Orthopedically stable  - DVT prophylaxis: PAS + Lovenox  - Continue physical therapy and occupational therapy  - Weight bearing as tolerated of the right lower extremity with assistance of a walker  - Incentive spirometry encouraged  - Pain control as clinically indicated  - Disposition: subacute rehabilitation

## 2019-03-02 NOTE — PROGRESS NOTE ADULT - SUBJECTIVE AND OBJECTIVE BOX
CC.  Right hip pain  HPI.  Patient reports right hip pain is controlled.  Cough, and SOB are improving.  Offers no other complaints                Constitutional: No fever, fatigue or weight loss.  Skin: No rash.  Eyes: No recent vision problems or eye pain.  ENT: No congestion, ear pain, or sore throat.  Endocrine: No thyroid problems.  Cardiovascular: No chest pain or palpation.  Respiratory: No  wheezing.  Gastrointestinal: No abdominal pain, nausea, vomiting, or diarrhea.  Genitourinary: No dysuria.  Musculoskeletal: No joint swelling.  Neurologic: No headache.      Vital Signs Last 24 Hrs  T(C): 36.6 (03-02-19 @ 08:08), Max: 36.7 (03-01-19 @ 15:11)  T(F): 97.8 (03-02-19 @ 08:08), Max: 98.1 (03-01-19 @ 15:11)  HR: 96 (03-02-19 @ 08:08) (79 - 96)  BP: 118/60 (03-02-19 @ 08:08) (106/59 - 130/66)  BP(mean): --  RR: 18 (03-02-19 @ 08:08) (18 - 18)  SpO2: 97% (03-02-19 @ 08:08) (97% - 98%)        PHYSICAL EXAM-  GENERAL: Chronic ill appearing male   HEAD:  Atraumatic, Normocephalic  EYES: EOMI, PERRLA, conjunctiva and sclera clear  NECK: Supple, No JVD, Normal thyroid  NERVOUS SYSTEM:  Alert & Oriented moving all extremites  CHEST/LUNG: Min Rhonchi sound with good air entry.  No retractions or accessory muscle usage  HEART: Regular rate and rhythm; No murmurs, rubs, or gallops  ABDOMEN: Soft, Nontender, Nondistended; Bowel sounds present  EXTREMITIES:  No clubbing, cyanosis, or edema  SKIN: No rashes or lesions                                  10.0   13.18 )-----------( 289      ( 02 Mar 2019 07:59 )             31.7     03-02    137  |  101  |  18  ----------------------------<  100<H>  3.6   |  32<H>  |  0.68    Ca    7.8<L>      02 Mar 2019 07:59                      MEDICATIONS  (STANDING):  atorvastatin 10 milliGRAM(s) Oral at bedtime  dorzolamide 2%/timolol 0.5% Ophthalmic Solution 1 Drop(s) Both EYES every 12 hours  enoxaparin Injectable 40 milliGRAM(s) SubCutaneous daily  hydrochlorothiazide 25 milliGRAM(s) Oral daily  latanoprost 0.005% Ophthalmic Solution 1 Drop(s) Both EYES at bedtime  lisinopril 10 milliGRAM(s) Oral daily  pentoxifylline 400 milliGRAM(s) Oral three times a day  piperacillin/tazobactam IVPB. 3.375 Gram(s) IV Intermittent every 8 hours  tamsulosin 0.4 milliGRAM(s) Oral at bedtime    MEDICATIONS  (PRN):  ALBUTerol/ipratropium for Nebulization 3 milliLiter(s) Nebulizer every 6 hours PRN Shortness of Breath  morphine  - Injectable 4 milliGRAM(s) IV Push every 4 hours PRN Moderate Pain (4 - 6)  oxyCODONE    IR 5 milliGRAM(s) Oral every 3 hours PRN Mild Pain (1 - 3)  oxyCODONE    IR 10 milliGRAM(s) Oral every 3 hours PRN Moderate Pain (4 - 6)           Imaging Personally Reviewed:     [x ] YES  [ ] NO    Consultant(s) Notes Reviewed:  [x ] YES  [ ] NO    Care Discussed with Consultants/Other Providers [x ] YES  [ ] NO

## 2019-03-03 LAB
ANION GAP SERPL CALC-SCNC: 5 MMOL/L — SIGNIFICANT CHANGE UP (ref 5–17)
BUN SERPL-MCNC: 19 MG/DL — SIGNIFICANT CHANGE UP (ref 7–23)
CALCIUM SERPL-MCNC: 8.1 MG/DL — LOW (ref 8.4–10.5)
CHLORIDE SERPL-SCNC: 101 MMOL/L — SIGNIFICANT CHANGE UP (ref 96–108)
CO2 SERPL-SCNC: 31 MMOL/L — SIGNIFICANT CHANGE UP (ref 22–31)
CREAT SERPL-MCNC: 0.72 MG/DL — SIGNIFICANT CHANGE UP (ref 0.5–1.3)
CULTURE RESULTS: SIGNIFICANT CHANGE UP
CULTURE RESULTS: SIGNIFICANT CHANGE UP
GLUCOSE SERPL-MCNC: 100 MG/DL — HIGH (ref 70–99)
HCT VFR BLD CALC: 32.2 % — LOW (ref 39–50)
HGB BLD-MCNC: 9.9 G/DL — LOW (ref 13–17)
MCHC RBC-ENTMCNC: 26.5 PG — LOW (ref 27–34)
MCHC RBC-ENTMCNC: 30.7 GM/DL — LOW (ref 32–36)
MCV RBC AUTO: 86.1 FL — SIGNIFICANT CHANGE UP (ref 80–100)
NRBC # BLD: 0 /100 WBCS — SIGNIFICANT CHANGE UP (ref 0–0)
PLATELET # BLD AUTO: 282 K/UL — SIGNIFICANT CHANGE UP (ref 150–400)
POTASSIUM SERPL-MCNC: 3.6 MMOL/L — SIGNIFICANT CHANGE UP (ref 3.5–5.3)
POTASSIUM SERPL-SCNC: 3.6 MMOL/L — SIGNIFICANT CHANGE UP (ref 3.5–5.3)
RBC # BLD: 3.74 M/UL — LOW (ref 4.2–5.8)
RBC # FLD: 15.7 % — HIGH (ref 10.3–14.5)
SODIUM SERPL-SCNC: 137 MMOL/L — SIGNIFICANT CHANGE UP (ref 135–145)
SPECIMEN SOURCE: SIGNIFICANT CHANGE UP
SPECIMEN SOURCE: SIGNIFICANT CHANGE UP
T3 SERPL-MCNC: 62 NG/DL — LOW (ref 80–200)
T4 AB SER-ACNC: 5.1 UG/DL — SIGNIFICANT CHANGE UP (ref 4.6–12)
WBC # BLD: 11.56 K/UL — HIGH (ref 3.8–10.5)
WBC # FLD AUTO: 11.56 K/UL — HIGH (ref 3.8–10.5)

## 2019-03-03 PROCEDURE — 73610 X-RAY EXAM OF ANKLE: CPT | Mod: 26,RT

## 2019-03-03 PROCEDURE — 99233 SBSQ HOSP IP/OBS HIGH 50: CPT

## 2019-03-03 RX ADMIN — ATORVASTATIN CALCIUM 10 MILLIGRAM(S): 80 TABLET, FILM COATED ORAL at 21:55

## 2019-03-03 RX ADMIN — PIPERACILLIN AND TAZOBACTAM 25 GRAM(S): 4; .5 INJECTION, POWDER, LYOPHILIZED, FOR SOLUTION INTRAVENOUS at 23:10

## 2019-03-03 RX ADMIN — ENOXAPARIN SODIUM 40 MILLIGRAM(S): 100 INJECTION SUBCUTANEOUS at 10:42

## 2019-03-03 RX ADMIN — Medication 25 MILLIGRAM(S): at 05:39

## 2019-03-03 RX ADMIN — ALBUTEROL 2.5 MILLIGRAM(S): 90 AEROSOL, METERED ORAL at 23:50

## 2019-03-03 RX ADMIN — ALBUTEROL 2.5 MILLIGRAM(S): 90 AEROSOL, METERED ORAL at 07:30

## 2019-03-03 RX ADMIN — Medication 20 MILLIGRAM(S): at 05:40

## 2019-03-03 RX ADMIN — Medication 400 MILLIGRAM(S): at 21:55

## 2019-03-03 RX ADMIN — Medication 400 MILLIGRAM(S): at 13:20

## 2019-03-03 RX ADMIN — Medication 400 MILLIGRAM(S): at 05:39

## 2019-03-03 RX ADMIN — ALBUTEROL 2.5 MILLIGRAM(S): 90 AEROSOL, METERED ORAL at 15:58

## 2019-03-03 RX ADMIN — DORZOLAMIDE HYDROCHLORIDE TIMOLOL MALEATE 1 DROP(S): 20; 5 SOLUTION/ DROPS OPHTHALMIC at 21:55

## 2019-03-03 RX ADMIN — DORZOLAMIDE HYDROCHLORIDE TIMOLOL MALEATE 1 DROP(S): 20; 5 SOLUTION/ DROPS OPHTHALMIC at 12:01

## 2019-03-03 RX ADMIN — TAMSULOSIN HYDROCHLORIDE 0.4 MILLIGRAM(S): 0.4 CAPSULE ORAL at 21:55

## 2019-03-03 RX ADMIN — LISINOPRIL 10 MILLIGRAM(S): 2.5 TABLET ORAL at 05:39

## 2019-03-03 RX ADMIN — PIPERACILLIN AND TAZOBACTAM 25 GRAM(S): 4; .5 INJECTION, POWDER, LYOPHILIZED, FOR SOLUTION INTRAVENOUS at 09:10

## 2019-03-03 RX ADMIN — PIPERACILLIN AND TAZOBACTAM 25 GRAM(S): 4; .5 INJECTION, POWDER, LYOPHILIZED, FOR SOLUTION INTRAVENOUS at 16:53

## 2019-03-03 RX ADMIN — LATANOPROST 1 DROP(S): 0.05 SOLUTION/ DROPS OPHTHALMIC; TOPICAL at 21:56

## 2019-03-03 NOTE — PROGRESS NOTE ADULT - ASSESSMENT
89M HTN, HLD, PAD and Moderate Aortic Stenosis admitted for Right IT Fracture and is post ORIF.    -Acute Respiratory Failure/effusion:   Secondary to volume overload and possible PNA from atelectasis. Significant Improvement with diuresis + Abx   cw zosyn.  for thoracentesis in am  -PE workup negative on CTA.    -Blood culture negative  -Echo done reveals mild diastolic CHF with moderate AS.   -Continue with small dose of lasix  - Pulmonary consult  -Cardiology to follow up     -Right IT Fracture S/P ORIF   Continue with pain management, DVT proph, and wound care as per Ortho.  PT/OT     -right ankle pain:  xray pending.     -multinodular goiter:  tsh, t3, t4  us thyroid pending    -HTN  Lisinopril + HCTZ    -Aortic Stenosis  Maintain hemodynamics and volume.  Cardio to follow up     -HLD   Atorvastatin    -BPH  Flomax    -DVT Prophylaxis  Heparin 5000 SC TID     -Lung mass  Noticed on Chest CT scan suspicious for malignancy   Pulmonary consult  -Discussed case with son Vicente Fairchild who recommended to hold off on any intervention at this time.  Will follow up as outpatient with PMD    -Disposition  Full Code/Inpatient  Discharge will be to rehab hopefully in 1-2 days as the patients respiratory has markedly improved with diuresis. 89M HTN, HLD, PAD and Moderate Aortic Stenosis admitted for Right IT Fracture and is post ORIF.    -Acute Respiratory Failure/effusion:   Secondary to volume overload and possible PNA from atelectasis. Significant Improvement with diuresis + Abx   cw zosyn.  for thoracentesis in am  -PE workup negative on CTA.    -Blood culture negative  -Echo done reveals mild diastolic CHF with moderate AS.   -Continue with small dose of lasix  - Pulmonary consult  -Cardiology to follow up   will dc bartholomew and do TOV.     -Right IT Fracture S/P ORIF   Continue with pain management, DVT proph, and wound care as per Ortho.  PT/OT     -right ankle pain:  xray pending.     -multinodular goiter:  tsh, t3, t4  us thyroid pending    -HTN  Lisinopril + HCTZ    -Aortic Stenosis  Maintain hemodynamics and volume.  Cardio to follow up     -HLD   Atorvastatin    -BPH  Flomax    -DVT Prophylaxis  Heparin 5000 SC TID     -Lung mass  Noticed on Chest CT scan suspicious for malignancy   Pulmonary consult  -Discussed case with son Vicente Fairchild who recommended to hold off on any intervention at this time.  Will follow up as outpatient with PMD    -Disposition  Full Code/Inpatient  Discharge will be to rehab hopefully in 1-2 days as the patients respiratory has markedly improved with diuresis.

## 2019-03-03 NOTE — PROGRESS NOTE ADULT - SUBJECTIVE AND OBJECTIVE BOX
POST OPERATIVE DAY #: 8  STATUS POST: ORIF Right hip                    SUBJECTIVE: Patient seen and examined.     Reported Pain Score (0-10):     OBJECTIVE:     Vital Signs Last 24 Hrs  T(C): 36.7 (03 Mar 2019 07:52), Max: 36.9 (02 Mar 2019 15:00)  T(F): 98 (03 Mar 2019 07:52), Max: 98.5 (02 Mar 2019 15:00)  HR: 81 (03 Mar 2019 07:52) (60 - 83)  BP: 106/57 (03 Mar 2019 07:52) (103/61 - 164/65)  BP(mean): --  RR: 18 (03 Mar 2019 07:52) (16 - 18)  SpO2: 96% (03 Mar 2019 07:52) (96% - 99%)    Right  Left extremity:          Dressing:  clean/dry/intact            Sensation:  intact to light touch          Motor exam: 5/5          warm well perfused; capillary refill <3 seconds     LABS:                        9.9    11.56 )-----------( 282      ( 03 Mar 2019 06:56 )             32.2     03-03    137  |  101  |  19  ----------------------------<  100<H>  3.6   |  31  |  0.72    Ca    8.1<L>      03 Mar 2019 06:56            MEDICATIONS:  Anticoagulation:  enoxaparin Injectable 40 milliGRAM(s) SubCutaneous daily  pentoxifylline 400 milliGRAM(s) Oral three times a day      Pain medications:         A/P :   s/p ORIF Right  Left   POD #   -    Pain control  -    DVT ppx:   -    Physical Therapy  -    Discharge planning: POST OPERATIVE DAY #: 8  STATUS POST: ORIF Right hip                    SUBJECTIVE: Patient seen and examined. Resting comfortably in bed.  No new complaints.    Reported Pain Score (0-10):  0    OBJECTIVE:     Vital Signs Last 24 Hrs  T(C): 36.7 (03 Mar 2019 07:52), Max: 36.9 (02 Mar 2019 15:00)  T(F): 98 (03 Mar 2019 07:52), Max: 98.5 (02 Mar 2019 15:00)  HR: 81 (03 Mar 2019 07:52) (60 - 83)  BP: 106/57 (03 Mar 2019 07:52) (103/61 - 164/65)  RR: 18 (03 Mar 2019 07:52) (16 - 18)  SpO2: 96% (03 Mar 2019 07:52) (96% - 99%)    Right hip          Incisions:  clean/dry/intact, staples in place         resolving diffuse ecchymosis         Sensation:  intact to light touch          Motor exam: 5/5          warm well perfused; capillary refill <3 seconds     LABS:                        9.9    11.56 )-----------( 282      ( 03 Mar 2019 06:56 )             32.2     03-03    137  |  101  |  19  ----------------------------<  100<H>  3.6   |  31  |  0.72    Ca    8.1<L>      03 Mar 2019 06:56      MEDICATIONS:  Anticoagulation:  enoxaparin Injectable 40 milliGRAM(s) SubCutaneous daily  pentoxifylline 400 milliGRAM(s) Oral three times a day      A/P : Orthopedically stable  s/p ORIF Right hip  POD # 8  -    pleural effusion, atelectasis - Thoracentesis tomorrow  -    On Zosyn; nebulizers  -    DVT ppx: lovenox  -    Physical Therapy  -    Pulmonary note appreciated  -    Discharge planning: rehab when medically stable

## 2019-03-03 NOTE — PROGRESS NOTE ADULT - SUBJECTIVE AND OBJECTIVE BOX
Patient is a 89y old  Male who presents with a chief complaint of right hip pain after a fall (03 Mar 2019 12:44)    HPI:  89M with HTN, HLD, peripheral artery disease, arthritis, who presents with right hip pain after a fall.  Patient was getting ready to eat his dinner when he slipped and landed on his right gluteus/hip.  Patient said he was wearing slippers that had no traction.  Denied any head trauma or lost of consciousness.  Denied any chest pain, SOB, palpitaitons right before fall.  Patient was brought here to the ED where he was found to have a right intertrochanteric fracture.  Prior to the fall, patient has been in his usual state of health.  Does complain of chronic arthritic knee pain, intermittent constipation, and recent swelling of BLE for the past 2 months.  Denied any recent fevers, cough, diarrhea.  Orthopedics was consulted and recommended surgery. (22 Feb 2019 23:31)    Today:    PAST MEDICAL & SURGICAL HISTORY:  Peripheral artery disease  Arthritis  Hyperlipidemia, unspecified hyperlipidemia type  Essential hypertension  History of back surgery: not spinal surgery, possibly lipoma?  H/O hernia repair    MEDICATIONS  (STANDING):  ALBUTerol    0.083% 2.5 milliGRAM(s) Nebulizer every 8 hours  atorvastatin 10 milliGRAM(s) Oral at bedtime  dorzolamide 2%/timolol 0.5% Ophthalmic Solution 1 Drop(s) Both EYES every 12 hours  enoxaparin Injectable 40 milliGRAM(s) SubCutaneous daily  furosemide    Tablet 20 milliGRAM(s) Oral daily  hydrochlorothiazide 25 milliGRAM(s) Oral daily  latanoprost 0.005% Ophthalmic Solution 1 Drop(s) Both EYES at bedtime  lisinopril 10 milliGRAM(s) Oral daily  pentoxifylline 400 milliGRAM(s) Oral three times a day  piperacillin/tazobactam IVPB. 3.375 Gram(s) IV Intermittent every 8 hours  tamsulosin 0.4 milliGRAM(s) Oral at bedtime    MEDICATIONS  (PRN):  ALBUTerol/ipratropium for Nebulization 3 milliLiter(s) Nebulizer every 6 hours PRN Shortness of Breath    EXAM:  Vital Signs Last 24 Hrs  T(C): 36.7 (03 Mar 2019 07:52), Max: 36.9 (02 Mar 2019 15:00)  T(F): 98 (03 Mar 2019 07:52), Max: 98.5 (02 Mar 2019 15:00)  HR: 81 (03 Mar 2019 07:52) (60 - 83)  BP: 106/57 (03 Mar 2019 07:52) (103/61 - 164/65)  BP(mean): --  RR: 18 (03 Mar 2019 07:52) (16 - 18)  SpO2: 96% (03 Mar 2019 07:52) (96% - 99%)    03-02 @ 07:01  -  03-03 @ 07:00  --------------------------------------------------------  IN: 0 mL / OUT: 1600 mL / NET: -1600 mL    03-03 @ 07:01  -  03-03 @ 14:53  --------------------------------------------------------  IN: 0 mL / OUT: 400 mL / NET: -400 mL    PHYSICAL EXAM:  Constitutional: awake sleeping in stretcher chair.   Eyes: eomi, perrla.  ENMT: patent nares, moist mucus membranes  Neck: supple  Breasts: deferred  Back: non tender. no scoliosis.   Respiratory: bilaterally clear to auscultation, no wheezing, no rhonchi, no crackles, no decreased air entry  Cardiovascular: s1s2, rrr, no murmurs.   Gastrointestinal: soft, non tender, +bowel sounds, no rebound, no guarding.   Genitourinary: deferred  Rectal: deferred   Extremities: no edema.   Vascular:   Neurological: alert and oriented to person, date and place.   Skin: intact no rash, warm to touch.   Lymph Nodes:  Musculoskeletal: moves all 4 extremities  Psychiatric: no homicidal, suicidal ideation. appropriate affect.   Heme/Lymph:     LABS:                        9.9    11.56 )-----------( 282      ( 03 Mar 2019 06:56 )             32.2     03-03    137  |  101  |  19  ----------------------------<  100<H>  3.6   |  31  |  0.72    Ca    8.1<L>      03 Mar 2019 06:56              Chart reviewed.   Labs reviewed.  Imaging reviewed.   Plan discussed with consultants. Patient is a 89y old  Male who presents with a chief complaint of right hip pain after a fall (03 Mar 2019 12:44)    HPI:  89M with HTN, HLD, peripheral artery disease, arthritis, who presents with right hip pain after a fall.  Patient was getting ready to eat his dinner when he slipped and landed on his right gluteus/hip.  Patient said he was wearing slippers that had no traction.  Denied any head trauma or lost of consciousness.  Denied any chest pain, SOB, palpitaitons right before fall.  Patient was brought here to the ED where he was found to have a right intertrochanteric fracture.  Prior to the fall, patient has been in his usual state of health.  Does complain of chronic arthritic knee pain, intermittent constipation, and recent swelling of BLE for the past 2 months.  Denied any recent fevers, cough, diarrhea.  Orthopedics was consulted and recommended surgery. (22 Feb 2019 23:31)    Today:  Pt c/o pain in his right ankle. Occurs when he sits up in the bed.     PAST MEDICAL & SURGICAL HISTORY:  Peripheral artery disease  Arthritis  Hyperlipidemia, unspecified hyperlipidemia type  Essential hypertension  History of back surgery: not spinal surgery, possibly lipoma?  H/O hernia repair    MEDICATIONS  (STANDING):  ALBUTerol    0.083% 2.5 milliGRAM(s) Nebulizer every 8 hours  atorvastatin 10 milliGRAM(s) Oral at bedtime  dorzolamide 2%/timolol 0.5% Ophthalmic Solution 1 Drop(s) Both EYES every 12 hours  enoxaparin Injectable 40 milliGRAM(s) SubCutaneous daily  furosemide    Tablet 20 milliGRAM(s) Oral daily  hydrochlorothiazide 25 milliGRAM(s) Oral daily  latanoprost 0.005% Ophthalmic Solution 1 Drop(s) Both EYES at bedtime  lisinopril 10 milliGRAM(s) Oral daily  pentoxifylline 400 milliGRAM(s) Oral three times a day  piperacillin/tazobactam IVPB. 3.375 Gram(s) IV Intermittent every 8 hours  tamsulosin 0.4 milliGRAM(s) Oral at bedtime    MEDICATIONS  (PRN):  ALBUTerol/ipratropium for Nebulization 3 milliLiter(s) Nebulizer every 6 hours PRN Shortness of Breath    EXAM:  Vital Signs Last 24 Hrs  T(C): 36.7 (03 Mar 2019 07:52), Max: 36.9 (02 Mar 2019 15:00)  T(F): 98 (03 Mar 2019 07:52), Max: 98.5 (02 Mar 2019 15:00)  HR: 81 (03 Mar 2019 07:52) (60 - 83)  BP: 106/57 (03 Mar 2019 07:52) (103/61 - 164/65)  BP(mean): --  RR: 18 (03 Mar 2019 07:52) (16 - 18)  SpO2: 96% (03 Mar 2019 07:52) (96% - 99%)    03-02 @ 07:01  -  03-03 @ 07:00  --------------------------------------------------------  IN: 0 mL / OUT: 1600 mL / NET: -1600 mL    03-03 @ 07:01  -  03-03 @ 14:53  --------------------------------------------------------  IN: 0 mL / OUT: 400 mL / NET: -400 mL    PHYSICAL EXAM:  Constitutional: awake in bed, on nasal canula   ENMT: patent nares, moist mucus membranes  Neck: supple  Respiratory: decreased bs.   Cardiovascular: s1s2, rrr, +ve murmurs.   Gastrointestinal: soft, non tender, +bowel sounds, no rebound, no guarding.   Genitourinary: +bartholomew  Extremities: +ve edema to RLE, incision is clean and dry. able to dorsiflex right foot. non tender on palpation.    Neurological: alert and oriented to person, date and place.   Skin:, warm to touch.   Musculoskeletal: moves all 4 extremities  Psychiatric: no homicidal, suicidal ideation. appropriate affect.     LABS:                        9.9    11.56 )-----------( 282      ( 03 Mar 2019 06:56 )             32.2     03-03    137  |  101  |  19  ----------------------------<  100<H>  3.6   |  31  |  0.72    Ca    8.1<L>      03 Mar 2019 06:56    Chart reviewed.   Labs reviewed.  Imaging reviewed.   Plan discussed with consultants.

## 2019-03-03 NOTE — PROGRESS NOTE ADULT - PROBLEM SELECTOR PLAN 1
post op  effusion  atelectasis  I elpidio  NEBS  diuresis  thoracentesis tomorrow - discussed with son and patient  will hold Lovenox in am tomorrow  PT as tolerated  pain assessment  will follow and monitor  prognosis guarded  eventual DC to [facility]

## 2019-03-03 NOTE — PROGRESS NOTE ADULT - SUBJECTIVE AND OBJECTIVE BOX
Date/Time Patient Seen:  		  Referring MD:   Data Reviewed	       Patient is a 89y old  Male who presents with a chief complaint of right hip pain after a fall (02 Mar 2019 17:51)      Subjective/HPI     PAST MEDICAL & SURGICAL HISTORY:  Peripheral artery disease  Arthritis  Hyperlipidemia, unspecified hyperlipidemia type  Essential hypertension  History of back surgery: not spinal surgery, possibly lipoma?  H/O hernia repair        Medication list         MEDICATIONS  (STANDING):  ALBUTerol    0.083% 2.5 milliGRAM(s) Nebulizer every 8 hours  atorvastatin 10 milliGRAM(s) Oral at bedtime  dorzolamide 2%/timolol 0.5% Ophthalmic Solution 1 Drop(s) Both EYES every 12 hours  enoxaparin Injectable 40 milliGRAM(s) SubCutaneous daily  furosemide    Tablet 20 milliGRAM(s) Oral daily  hydrochlorothiazide 25 milliGRAM(s) Oral daily  latanoprost 0.005% Ophthalmic Solution 1 Drop(s) Both EYES at bedtime  lisinopril 10 milliGRAM(s) Oral daily  pentoxifylline 400 milliGRAM(s) Oral three times a day  piperacillin/tazobactam IVPB. 3.375 Gram(s) IV Intermittent every 8 hours  tamsulosin 0.4 milliGRAM(s) Oral at bedtime    MEDICATIONS  (PRN):  ALBUTerol/ipratropium for Nebulization 3 milliLiter(s) Nebulizer every 6 hours PRN Shortness of Breath         Vitals log        ICU Vital Signs Last 24 Hrs  T(C): 36.4 (02 Mar 2019 23:15), Max: 36.9 (02 Mar 2019 15:00)  T(F): 97.5 (02 Mar 2019 23:15), Max: 98.5 (02 Mar 2019 15:00)  HR: 82 (02 Mar 2019 23:15) (60 - 96)  BP: 103/61 (02 Mar 2019 23:15) (103/61 - 164/65)  BP(mean): --  ABP: --  ABP(mean): --  RR: 16 (02 Mar 2019 23:15) (16 - 18)  SpO2: 97% (02 Mar 2019 23:15) (97% - 99%)           Input and Output:  I&O's Detail    02 Mar 2019 07:01  -  03 Mar 2019 07:00  --------------------------------------------------------  IN:  Total IN: 0 mL    OUT:    Indwelling Catheter - Urethral: 1600 mL  Total OUT: 1600 mL    Total NET: -1600 mL          Lab Data                        10.0   13.18 )-----------( 289      ( 02 Mar 2019 07:59 )             31.7     03-02    137  |  101  |  18  ----------------------------<  100<H>  3.6   |  32<H>  |  0.68    Ca    7.8<L>      02 Mar 2019 07:59              Review of Systems	      Objective     Physical Examination    heart s1s2  lung dec BS      Pertinent Lab findings & Imaging      Jamal:  NO   Adequate UO     I&O's Detail    02 Mar 2019 07:01  -  03 Mar 2019 07:00  --------------------------------------------------------  IN:  Total IN: 0 mL    OUT:    Indwelling Catheter - Urethral: 1600 mL  Total OUT: 1600 mL    Total NET: -1600 mL               Discussed with:     Cultures:	        Radiology

## 2019-03-04 ENCOUNTER — RESULT REVIEW (OUTPATIENT)
Age: 84
End: 2019-03-04

## 2019-03-04 LAB
ANION GAP SERPL CALC-SCNC: 4 MMOL/L — LOW (ref 5–17)
BUN SERPL-MCNC: 16 MG/DL — SIGNIFICANT CHANGE UP (ref 7–23)
CALCIUM SERPL-MCNC: 7.9 MG/DL — LOW (ref 8.4–10.5)
CHLORIDE SERPL-SCNC: 103 MMOL/L — SIGNIFICANT CHANGE UP (ref 96–108)
CO2 SERPL-SCNC: 33 MMOL/L — HIGH (ref 22–31)
CREAT SERPL-MCNC: 0.75 MG/DL — SIGNIFICANT CHANGE UP (ref 0.5–1.3)
GLUCOSE SERPL-MCNC: 103 MG/DL — HIGH (ref 70–99)
HCT VFR BLD CALC: 35.4 % — LOW (ref 39–50)
HGB BLD-MCNC: 10.8 G/DL — LOW (ref 13–17)
LDH SERPL L TO P-CCNC: 242 U/L — SIGNIFICANT CHANGE UP (ref 50–242)
MCHC RBC-ENTMCNC: 26 PG — LOW (ref 27–34)
MCHC RBC-ENTMCNC: 30.5 GM/DL — LOW (ref 32–36)
MCV RBC AUTO: 85.1 FL — SIGNIFICANT CHANGE UP (ref 80–100)
NRBC # BLD: 0 /100 WBCS — SIGNIFICANT CHANGE UP (ref 0–0)
PH FLD: 7.77 — SIGNIFICANT CHANGE UP
PLATELET # BLD AUTO: 315 K/UL — SIGNIFICANT CHANGE UP (ref 150–400)
POTASSIUM SERPL-MCNC: 3.7 MMOL/L — SIGNIFICANT CHANGE UP (ref 3.5–5.3)
POTASSIUM SERPL-SCNC: 3.7 MMOL/L — SIGNIFICANT CHANGE UP (ref 3.5–5.3)
RBC # BLD: 4.16 M/UL — LOW (ref 4.2–5.8)
RBC # FLD: 15.9 % — HIGH (ref 10.3–14.5)
SODIUM SERPL-SCNC: 140 MMOL/L — SIGNIFICANT CHANGE UP (ref 135–145)
TSH SERPL-MCNC: 3.3 UIU/ML — SIGNIFICANT CHANGE UP (ref 0.27–4.2)
WBC # BLD: 13.78 K/UL — HIGH (ref 3.8–10.5)
WBC # FLD AUTO: 13.78 K/UL — HIGH (ref 3.8–10.5)

## 2019-03-04 PROCEDURE — 71045 X-RAY EXAM CHEST 1 VIEW: CPT | Mod: 26

## 2019-03-04 PROCEDURE — 32555 ASPIRATE PLEURA W/ IMAGING: CPT | Mod: LT

## 2019-03-04 PROCEDURE — 76536 US EXAM OF HEAD AND NECK: CPT | Mod: 26

## 2019-03-04 PROCEDURE — 88305 TISSUE EXAM BY PATHOLOGIST: CPT | Mod: 26

## 2019-03-04 PROCEDURE — 99232 SBSQ HOSP IP/OBS MODERATE 35: CPT

## 2019-03-04 PROCEDURE — 88108 CYTOPATH CONCENTRATE TECH: CPT | Mod: 26

## 2019-03-04 RX ADMIN — Medication 400 MILLIGRAM(S): at 18:29

## 2019-03-04 RX ADMIN — LATANOPROST 1 DROP(S): 0.05 SOLUTION/ DROPS OPHTHALMIC; TOPICAL at 21:03

## 2019-03-04 RX ADMIN — Medication 400 MILLIGRAM(S): at 05:14

## 2019-03-04 RX ADMIN — Medication 25 MILLIGRAM(S): at 08:31

## 2019-03-04 RX ADMIN — TAMSULOSIN HYDROCHLORIDE 0.4 MILLIGRAM(S): 0.4 CAPSULE ORAL at 21:03

## 2019-03-04 RX ADMIN — ALBUTEROL 2.5 MILLIGRAM(S): 90 AEROSOL, METERED ORAL at 23:29

## 2019-03-04 RX ADMIN — Medication 400 MILLIGRAM(S): at 21:03

## 2019-03-04 RX ADMIN — LISINOPRIL 10 MILLIGRAM(S): 2.5 TABLET ORAL at 05:14

## 2019-03-04 RX ADMIN — PIPERACILLIN AND TAZOBACTAM 25 GRAM(S): 4; .5 INJECTION, POWDER, LYOPHILIZED, FOR SOLUTION INTRAVENOUS at 23:29

## 2019-03-04 RX ADMIN — PIPERACILLIN AND TAZOBACTAM 25 GRAM(S): 4; .5 INJECTION, POWDER, LYOPHILIZED, FOR SOLUTION INTRAVENOUS at 08:31

## 2019-03-04 RX ADMIN — ALBUTEROL 2.5 MILLIGRAM(S): 90 AEROSOL, METERED ORAL at 07:34

## 2019-03-04 RX ADMIN — Medication 20 MILLIGRAM(S): at 05:14

## 2019-03-04 RX ADMIN — DORZOLAMIDE HYDROCHLORIDE TIMOLOL MALEATE 1 DROP(S): 20; 5 SOLUTION/ DROPS OPHTHALMIC at 10:08

## 2019-03-04 RX ADMIN — PIPERACILLIN AND TAZOBACTAM 25 GRAM(S): 4; .5 INJECTION, POWDER, LYOPHILIZED, FOR SOLUTION INTRAVENOUS at 18:30

## 2019-03-04 RX ADMIN — ATORVASTATIN CALCIUM 10 MILLIGRAM(S): 80 TABLET, FILM COATED ORAL at 21:03

## 2019-03-04 RX ADMIN — DORZOLAMIDE HYDROCHLORIDE TIMOLOL MALEATE 1 DROP(S): 20; 5 SOLUTION/ DROPS OPHTHALMIC at 21:03

## 2019-03-04 RX ADMIN — ALBUTEROL 2.5 MILLIGRAM(S): 90 AEROSOL, METERED ORAL at 15:15

## 2019-03-04 NOTE — PROGRESS NOTE ADULT - ASSESSMENT
89M HTN, HLD, PAD and Moderate Aortic Stenosis admitted for Right IT Fracture and is post ORIF.    Acute Respiratory Failure  Secondary to volume overload and possible PNA from atelectasis. Significant Improvement with diuresis + Abx   -PE workup negative on CTA.    -Blood culture negative  -Echo done reveals mild diastolic CHF with moderate AS.   -Continue with small dose of lasix  Thoracentesis in am  -Pulmonary and Cardiology to follow up       Right IT Fracture S/P ORIF   Continue with pain management, DVT proph, and wound care as per Ortho.  PT/OT       HTN  Lisinopril + HCTZ    Aortic Stenosis  Maintain hemodynamics and volume.  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  Will follow up as outpatient with PMD    Disposition  Full Code/Inpatient  Discharge will be to rehab hopefully in 1-2 days as the patients respiratory has markedly improved with diuresis. 89M HTN, HLD, PAD and Moderate Aortic Stenosis admitted for Right IT Fracture and is post ORIF.    Acute Respiratory Failure  Secondary to volume overload and possible PNA from atelectasis. Significant Improvement with diuresis + Abx   -PE workup negative on CTA.    -Blood culture negative  -Echo done reveals mild diastolic CHF with moderate AS.   -Continue with small dose of lasix  Thoracentesis in am  -Pulmonary and Cardiology to follow up       Right IT Fracture S/P ORIF   Continue with pain management, DVT proph, and wound care as per Ortho.  PT/OT       HTN  Lisinopril + HCTZ    Aortic Stenosis  Maintain hemodynamics and volume.  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  Will follow up as outpatient with PMD    -multinodular goiter:  tsh, t3, t4  us thyroid pending    Disposition  Full Code/Inpatient  Discharge will be to rehab hopefully in 1-2 days as the patients respiratory has markedly improved with diuresis.

## 2019-03-04 NOTE — PROGRESS NOTE ADULT - SUBJECTIVE AND OBJECTIVE BOX
Date/Time Patient Seen:  		  Referring MD:   Data Reviewed	       Patient is a 89y old  Male who presents with a chief complaint of right hip pain after a fall (03 Mar 2019 14:53)      Subjective/HPI     PAST MEDICAL & SURGICAL HISTORY:  Peripheral artery disease  Arthritis  Hyperlipidemia, unspecified hyperlipidemia type  Essential hypertension  History of back surgery: not spinal surgery, possibly lipoma?  H/O hernia repair        Medication list         MEDICATIONS  (STANDING):  ALBUTerol    0.083% 2.5 milliGRAM(s) Nebulizer every 8 hours  atorvastatin 10 milliGRAM(s) Oral at bedtime  dorzolamide 2%/timolol 0.5% Ophthalmic Solution 1 Drop(s) Both EYES every 12 hours  enoxaparin Injectable 40 milliGRAM(s) SubCutaneous daily  furosemide    Tablet 20 milliGRAM(s) Oral daily  hydrochlorothiazide 25 milliGRAM(s) Oral daily  latanoprost 0.005% Ophthalmic Solution 1 Drop(s) Both EYES at bedtime  lisinopril 10 milliGRAM(s) Oral daily  pentoxifylline 400 milliGRAM(s) Oral three times a day  piperacillin/tazobactam IVPB. 3.375 Gram(s) IV Intermittent every 8 hours  tamsulosin 0.4 milliGRAM(s) Oral at bedtime    MEDICATIONS  (PRN):  ALBUTerol/ipratropium for Nebulization 3 milliLiter(s) Nebulizer every 6 hours PRN Shortness of Breath         Vitals log        ICU Vital Signs Last 24 Hrs  T(C): 36.4 (03 Mar 2019 23:15), Max: 36.7 (03 Mar 2019 07:52)  T(F): 97.5 (03 Mar 2019 23:15), Max: 98 (03 Mar 2019 07:52)  HR: 87 (04 Mar 2019 05:12) (78 - 87)  BP: 123/69 (04 Mar 2019 05:12) (106/57 - 139/67)  BP(mean): --  ABP: --  ABP(mean): --  RR: 17 (03 Mar 2019 23:15) (16 - 18)  SpO2: 96% (03 Mar 2019 23:54) (96% - 97%)           Input and Output:  I&O's Detail    03 Mar 2019 07:01  -  04 Mar 2019 07:00  --------------------------------------------------------  IN:  Total IN: 0 mL    OUT:    Indwelling Catheter - Urethral: 1350 mL  Total OUT: 1350 mL    Total NET: -1350 mL          Lab Data                        9.9    11.56 )-----------( 282      ( 03 Mar 2019 06:56 )             32.2     03-03    137  |  101  |  19  ----------------------------<  100<H>  3.6   |  31  |  0.72    Ca    8.1<L>      03 Mar 2019 06:56              Review of Systems	      Objective     Physical Examination    frail  weak  on o2 support  lung dec BS  abd soft      Pertinent Lab findings & Imaging      Jamal:  NO   Adequate UO     I&O's Detail    03 Mar 2019 07:01  -  04 Mar 2019 07:00  --------------------------------------------------------  IN:  Total IN: 0 mL    OUT:    Indwelling Catheter - Urethral: 1350 mL  Total OUT: 1350 mL    Total NET: -1350 mL               Discussed with:     Cultures:	        Radiology

## 2019-03-04 NOTE — PROGRESS NOTE ADULT - SUBJECTIVE AND OBJECTIVE BOX
JAK HERNANDEZ                                                                [id number]                                                     Allergies---No Known Allergies        Pt is a 89y year old Male s/p ORIF right hip, due a fracture.   Pain is 2/10.   Tolerating regular diet.   (+) voids. The patient is A&O x 3, resting comfortably in bed, with no complaints.   Denies any chest pain / shortness of breath / dyspne/ nausea / vomitting / headaches or light headedness.      Vital Signs Last 24 Hrs  T(F): 97.8 (03-04-19 @ 07:00), Max: 97.8 (03-04-19 @ 07:00)  HR: 92 (03-04-19 @ 10:49)  BP: 118/67 (03-04-19 @ 07:00)  RR: 17 (03-04-19 @ 07:00)  SpO2: 97% (03-04-19 @ 10:49)    I&O's Detail    03 Mar 2019 07:01  -  04 Mar 2019 07:00  --------------------------------------------------------  IN:  Total IN: 0 mL    OUT:    Indwelling Catheter - Urethral: 1350 mL  Total OUT: 1350 mL    Total NET: -1350 mL        PE:   Right Lower Extremity:   Dressing is intact.   The dressing was changed with no complications.   Originally the wounds were closed with steri strips, however none are noted today.   Mild serous drainage is noted at the proximal wound with a small shallow opening noted.   NO redness, swelling, heat, increase pain or any other evidence of infection superficial or deep is noted.   A new clean and sterile dressing was applied, 2x2 with tegaderm.   (pt is currently on ABx)   NVI.   Sensation intact to light touch distally.   No gross evidence of motor deficit.   EHL/FHL/TA intact.   +2 DP/PT pulses.   Toes are pink and mobile.   Capillary refill < 2 seconds.   Negative calf tenderness.   No evidence of impending compartment syndrome.   PAS on.                             10.8   13.78 )--------------( 315                          03-04-19 @ 07:13               35.4       140   |  103  |  16  -----------------------------<  103                  03-04-19 @ 07:13  3.7    |  33    |  0.75        Ca    7.9<L>              A:   Pt is a 89y year old Male S/P ORIF right hip, Post Op Day #9        Plan:    - Follow up with Medicine    - OOB with PT/OT   - DVT ppx = PAS +  JAK HERNANDEZ   - Pain control    - Incentive spirometry   - continue Abx as ordered   - monitor closely proximal wound with frequent dressing changes   - Discharge Planning to rehab if ok with medicine                                                                                                                                                                           Tan STEVENSC

## 2019-03-04 NOTE — BRIEF OPERATIVE NOTE - PROCEDURE
<<-----Click on this checkbox to enter Procedure Thoracentesis of left pleural cavity using catheter with ultrasound guidance  03/04/2019    Active  WFORMAN

## 2019-03-04 NOTE — PROGRESS NOTE ADULT - PROBLEM SELECTOR PLAN 1
post op, HF, atelectasis, poss pna, copd, pleural eff, planned for thoracentesis this am  assist with ADL  oral hygiene  skin care  I elpidio   copd rx regimen  cvs regimen / lasix  I and O  am labs pending  holding am Lovenox for thoracentesis  discussed plan of care with pt and son  will follow

## 2019-03-05 PROBLEM — Z00.00 ENCOUNTER FOR PREVENTIVE HEALTH EXAMINATION: Status: ACTIVE | Noted: 2019-03-05

## 2019-03-05 LAB
ALBUMIN FLD-MCNC: 1.1 G/DL — SIGNIFICANT CHANGE UP
ANION GAP SERPL CALC-SCNC: 7 MMOL/L — SIGNIFICANT CHANGE UP (ref 5–17)
B PERT IGG+IGM PNL SER: CLEAR — SIGNIFICANT CHANGE UP
BUN SERPL-MCNC: 21 MG/DL — SIGNIFICANT CHANGE UP (ref 7–23)
CALCIUM SERPL-MCNC: 8.2 MG/DL — LOW (ref 8.4–10.5)
CHLORIDE SERPL-SCNC: 101 MMOL/L — SIGNIFICANT CHANGE UP (ref 96–108)
CO2 SERPL-SCNC: 28 MMOL/L — SIGNIFICANT CHANGE UP (ref 22–31)
COLOR FLD: YELLOW — SIGNIFICANT CHANGE UP
CREAT SERPL-MCNC: 0.61 MG/DL — SIGNIFICANT CHANGE UP (ref 0.5–1.3)
FLUID INTAKE SUBSTANCE CLASS: SIGNIFICANT CHANGE UP
FLUID SEGMENTED GRANULOCYTES: 21 % — SIGNIFICANT CHANGE UP
GLUCOSE FLD-MCNC: 163 MG/DL — SIGNIFICANT CHANGE UP
GLUCOSE SERPL-MCNC: 104 MG/DL — HIGH (ref 70–99)
GRAM STN FLD: SIGNIFICANT CHANGE UP
HCT VFR BLD CALC: 32.8 % — LOW (ref 39–50)
HGB BLD-MCNC: 10 G/DL — LOW (ref 13–17)
LDH SERPL L TO P-CCNC: 85 U/L — SIGNIFICANT CHANGE UP
LYMPHOCYTES # FLD: 15 % — SIGNIFICANT CHANGE UP
MCHC RBC-ENTMCNC: 26.1 PG — LOW (ref 27–34)
MCHC RBC-ENTMCNC: 30.5 GM/DL — LOW (ref 32–36)
MCV RBC AUTO: 85.6 FL — SIGNIFICANT CHANGE UP (ref 80–100)
MONOS+MACROS # FLD: 64 % — SIGNIFICANT CHANGE UP
NIGHT BLUE STAIN TISS: SIGNIFICANT CHANGE UP
NRBC # BLD: 0 /100 WBCS — SIGNIFICANT CHANGE UP (ref 0–0)
PLATELET # BLD AUTO: 271 K/UL — SIGNIFICANT CHANGE UP (ref 150–400)
POTASSIUM SERPL-MCNC: 3.4 MMOL/L — LOW (ref 3.5–5.3)
POTASSIUM SERPL-SCNC: 3.4 MMOL/L — LOW (ref 3.5–5.3)
PROT FLD-MCNC: 2 G/DL — SIGNIFICANT CHANGE UP
RBC # BLD: 3.83 M/UL — LOW (ref 4.2–5.8)
RBC # FLD: 16 % — HIGH (ref 10.3–14.5)
RCV VOL RI: 34 /UL — HIGH (ref 0–0)
SODIUM SERPL-SCNC: 136 MMOL/L — SIGNIFICANT CHANGE UP (ref 135–145)
SPECIMEN SOURCE: SIGNIFICANT CHANGE UP
SPECIMEN SOURCE: SIGNIFICANT CHANGE UP
TOTAL NUCLEATED CELL COUNT, BODY FLUID: 260 /UL — SIGNIFICANT CHANGE UP
TUBE TYPE: SIGNIFICANT CHANGE UP
WBC # BLD: 15.4 K/UL — HIGH (ref 3.8–10.5)
WBC # FLD AUTO: 15.4 K/UL — HIGH (ref 3.8–10.5)

## 2019-03-05 PROCEDURE — 99233 SBSQ HOSP IP/OBS HIGH 50: CPT

## 2019-03-05 RX ORDER — POTASSIUM CHLORIDE 20 MEQ
40 PACKET (EA) ORAL ONCE
Qty: 0 | Refills: 0 | Status: COMPLETED | OUTPATIENT
Start: 2019-03-05 | End: 2019-03-05

## 2019-03-05 RX ORDER — POTASSIUM CHLORIDE 20 MEQ
40 PACKET (EA) ORAL ONCE
Qty: 0 | Refills: 0 | Status: DISCONTINUED | OUTPATIENT
Start: 2019-03-05 | End: 2019-03-05

## 2019-03-05 RX ORDER — FUROSEMIDE 40 MG
40 TABLET ORAL ONCE
Qty: 0 | Refills: 0 | Status: COMPLETED | OUTPATIENT
Start: 2019-03-05 | End: 2019-03-05

## 2019-03-05 RX ADMIN — Medication 40 MILLIGRAM(S): at 11:47

## 2019-03-05 RX ADMIN — Medication 25 MILLIGRAM(S): at 06:01

## 2019-03-05 RX ADMIN — LATANOPROST 1 DROP(S): 0.05 SOLUTION/ DROPS OPHTHALMIC; TOPICAL at 21:28

## 2019-03-05 RX ADMIN — TAMSULOSIN HYDROCHLORIDE 0.4 MILLIGRAM(S): 0.4 CAPSULE ORAL at 21:28

## 2019-03-05 RX ADMIN — LISINOPRIL 10 MILLIGRAM(S): 2.5 TABLET ORAL at 06:02

## 2019-03-05 RX ADMIN — Medication 400 MILLIGRAM(S): at 06:02

## 2019-03-05 RX ADMIN — Medication 400 MILLIGRAM(S): at 21:28

## 2019-03-05 RX ADMIN — ENOXAPARIN SODIUM 40 MILLIGRAM(S): 100 INJECTION SUBCUTANEOUS at 10:58

## 2019-03-05 RX ADMIN — Medication 40 MILLIEQUIVALENT(S): at 18:54

## 2019-03-05 RX ADMIN — ALBUTEROL 2.5 MILLIGRAM(S): 90 AEROSOL, METERED ORAL at 15:27

## 2019-03-05 RX ADMIN — DORZOLAMIDE HYDROCHLORIDE TIMOLOL MALEATE 1 DROP(S): 20; 5 SOLUTION/ DROPS OPHTHALMIC at 11:06

## 2019-03-05 RX ADMIN — ATORVASTATIN CALCIUM 10 MILLIGRAM(S): 80 TABLET, FILM COATED ORAL at 21:28

## 2019-03-05 RX ADMIN — PIPERACILLIN AND TAZOBACTAM 25 GRAM(S): 4; .5 INJECTION, POWDER, LYOPHILIZED, FOR SOLUTION INTRAVENOUS at 08:32

## 2019-03-05 RX ADMIN — Medication 1 APPLICATION(S): at 18:41

## 2019-03-05 RX ADMIN — DORZOLAMIDE HYDROCHLORIDE TIMOLOL MALEATE 1 DROP(S): 20; 5 SOLUTION/ DROPS OPHTHALMIC at 21:28

## 2019-03-05 RX ADMIN — ALBUTEROL 2.5 MILLIGRAM(S): 90 AEROSOL, METERED ORAL at 07:29

## 2019-03-05 RX ADMIN — Medication 20 MILLIGRAM(S): at 06:01

## 2019-03-05 RX ADMIN — Medication 400 MILLIGRAM(S): at 13:01

## 2019-03-05 RX ADMIN — PIPERACILLIN AND TAZOBACTAM 25 GRAM(S): 4; .5 INJECTION, POWDER, LYOPHILIZED, FOR SOLUTION INTRAVENOUS at 16:09

## 2019-03-05 RX ADMIN — PIPERACILLIN AND TAZOBACTAM 25 GRAM(S): 4; .5 INJECTION, POWDER, LYOPHILIZED, FOR SOLUTION INTRAVENOUS at 23:15

## 2019-03-05 NOTE — PROGRESS NOTE ADULT - SUBJECTIVE AND OBJECTIVE BOX
Date/Time Patient Seen:  		  Referring MD:   Data Reviewed	       Patient is a 89y old  Male who presents with a chief complaint of right hip pain after a fall (04 Mar 2019 11:27)      Subjective/HPI     PAST MEDICAL & SURGICAL HISTORY:  Peripheral artery disease  Arthritis  Hyperlipidemia, unspecified hyperlipidemia type  Essential hypertension  History of back surgery: not spinal surgery, possibly lipoma?  H/O hernia repair        Medication list         MEDICATIONS  (STANDING):  ALBUTerol    0.083% 2.5 milliGRAM(s) Nebulizer every 8 hours  atorvastatin 10 milliGRAM(s) Oral at bedtime  dorzolamide 2%/timolol 0.5% Ophthalmic Solution 1 Drop(s) Both EYES every 12 hours  enoxaparin Injectable 40 milliGRAM(s) SubCutaneous daily  furosemide    Tablet 20 milliGRAM(s) Oral daily  hydrochlorothiazide 25 milliGRAM(s) Oral daily  latanoprost 0.005% Ophthalmic Solution 1 Drop(s) Both EYES at bedtime  lisinopril 10 milliGRAM(s) Oral daily  pentoxifylline 400 milliGRAM(s) Oral three times a day  piperacillin/tazobactam IVPB. 3.375 Gram(s) IV Intermittent every 8 hours  tamsulosin 0.4 milliGRAM(s) Oral at bedtime    MEDICATIONS  (PRN):  ALBUTerol/ipratropium for Nebulization 3 milliLiter(s) Nebulizer every 6 hours PRN Shortness of Breath         Vitals log        ICU Vital Signs Last 24 Hrs  T(C): 36.9 (04 Mar 2019 23:30), Max: 36.9 (04 Mar 2019 23:30)  T(F): 98.5 (04 Mar 2019 23:30), Max: 98.5 (04 Mar 2019 23:30)  HR: 84 (04 Mar 2019 23:31) (80 - 92)  BP: 130/72 (04 Mar 2019 23:30) (94/46 - 130/72)  BP(mean): --  ABP: --  ABP(mean): --  RR: 18 (04 Mar 2019 23:30) (18 - 18)  SpO2: 97% (04 Mar 2019 23:31) (91% - 100%)           Input and Output:  I&O's Detail    04 Mar 2019 07:01  -  05 Mar 2019 07:00  --------------------------------------------------------  IN:  Total IN: 0 mL    OUT:    Voided: 600 mL  Total OUT: 600 mL    Total NET: -600 mL          Lab Data                        10.8   13.78 )-----------( 315      ( 04 Mar 2019 07:13 )             35.4     03-04    140  |  103  |  16  ----------------------------<  103<H>  3.7   |  33<H>  |  0.75    Ca    7.9<L>      04 Mar 2019 07:13              Review of Systems	      Objective     Physical Examination    heart s1s2  lung dec BS      Pertinent Lab findings & Imaging      Jamal:  NO   Adequate UO     I&O's Detail    04 Mar 2019 07:01  -  05 Mar 2019 07:00  --------------------------------------------------------  IN:  Total IN: 0 mL    OUT:    Voided: 600 mL  Total OUT: 600 mL    Total NET: -600 mL               Discussed with:     Cultures:	        Radiology

## 2019-03-05 NOTE — PROGRESS NOTE ADULT - SUBJECTIVE AND OBJECTIVE BOX
Post Op Day # 10    SUBJECTIVE    89yo Male status post Right THR .   Patient is alert and comfortable.    Pain is controlled with current pain regimen.  Denies nausea, vomiting, chest pain, shortness of breath, abdominal pain or fever.   No new complaints.    OBJECTIVE    Vital Signs Last 24 Hrs  T(C): 36.7 (05 Mar 2019 09:03), Max: 36.9 (04 Mar 2019 23:30)  T(F): 98.1 (05 Mar 2019 09:03), Max: 98.5 (04 Mar 2019 23:30)  HR: 87 (05 Mar 2019 10:52) (80 - 90)  BP: 114/63 (05 Mar 2019 09:03) (94/46 - 130/72)  BP(mean): --  RR: 16 (05 Mar 2019 09:03) (16 - 18)  SpO2: 94% (05 Mar 2019 10:52) (85% - 100%)  I&O's Summary    04 Mar 2019 07:01  -  05 Mar 2019 07:00  --------------------------------------------------------  IN: 0 mL / OUT: 600 mL / NET: -600 mL    05 Mar 2019 07:01  -  05 Mar 2019 11:16  --------------------------------------------------------  IN: 120 mL / OUT: 50 mL / NET: 70 mL        PHYSICAL EXAM    Right Hip incision is intact with minimal amount of serous drainage from proximal wound  No erythema/ No blistering/ No ecchymosis.   The calf is supple/nontender.   Passive range of motion is acceptable to due postoperative pain.   Sensation to light touch is grossly intact distally.   The lateral cutaneous nerve is intact.   Motor function distally is intact.   No foot drop.   (2+) dorsalis pedis pulse. Capillary refill is less than 2 seconds. No cyanosis.                          10.0<L>  15.40<H> )-----------( 271      ( 05 Mar 2019 07:23 )             32.8<L>  05 Mar 2019 07:23                        10.8<L>  13.78<H> )-----------( 315      ( 04 Mar 2019 07:13 )             35.4<L>  04 Mar 2019 07:13    05 Mar 2019 07:49    136    |  101    |  21     ----------------------------<  104<H>  3.4<L>   |  28     |  0.61   04 Mar 2019 07:13    140    |  103    |  16     ----------------------------<  103<H>  3.7     |  33<H>  |  0.75     Ca    8.2<L>      05 Mar 2019 07:49  Ca    7.9<L>      04 Mar 2019 07:13        ASSESSMENT AND PLAN    - Orthopedically stable  - DVT prophylaxis: PAS + Lovenox  - Continue physical therapy and occupational therapy  - Weight bearing as tolerated of the right lower extremity with assistance of a walker  - Incentive spirometry encouraged  - Pain control as clinically indicated  - Disposition: subacute rehabilitation

## 2019-03-05 NOTE — PROGRESS NOTE ADULT - PROBLEM SELECTOR PLAN 1
post op  atelectasis  HF  volume overload  pleural effusions - s/p left eff- 800 cc drained - transudative - consistent with CHF  cont LASIX  cont Albuterol   poss PNA - on Zosyn - would change to Augmentin  dc planning  I elpidio  PT as tolerated  assess on room air   keep sat > 88 pct  dc planning  will need CXR in 3 - 4 weeks  optimize cvs regimen  frail and weak - fall prec - assist with ADL

## 2019-03-05 NOTE — PROGRESS NOTE ADULT - SUBJECTIVE AND OBJECTIVE BOX
CC.  Right hip pain  HPI.  Patient reports right hip pain is controlled.  Cough, and SOB are improving.  Offers no other complaints                Constitutional: No fever, fatigue or weight loss.  Skin: No rash.  Eyes: No recent vision problems or eye pain.  ENT: No congestion, ear pain, or sore throat.  Endocrine: No thyroid problems.  Cardiovascular: No chest pain or palpation.  Respiratory: No  wheezing.  Gastrointestinal: No abdominal pain, nausea, vomiting, or diarrhea.  Genitourinary: No dysuria.  Musculoskeletal: No joint swelling.  Neurologic: No headache.    Vital Signs Last 24 Hrs  T(C): 36.7 (05 Mar 2019 09:03), Max: 36.9 (04 Mar 2019 23:30)  T(F): 98.1 (05 Mar 2019 09:03), Max: 98.5 (04 Mar 2019 23:30)  HR: 87 (05 Mar 2019 10:52) (80 - 90)  BP: 114/63 (05 Mar 2019 09:03) (94/46 - 130/72)  BP(mean): --  RR: 16 (05 Mar 2019 09:03) (16 - 18)  SpO2: 100% (05 Mar 2019 11:25) (85% - 100%)        PHYSICAL EXAM-  GENERAL: Chronic ill appearing male   HEAD:  Atraumatic, Normocephalic  EYES: EOMI, PERRLA, conjunctiva and sclera clear  NECK: Supple, No JVD, Normal thyroid  NERVOUS SYSTEM:  Alert & Oriented moving all extremites  CHEST/LUNG: Min Rhonchi sound with good air entry.  No retractions or accessory muscle usage  HEART: Regular rate and rhythm; No murmurs, rubs, or gallops  ABDOMEN: Soft, Nontender, Nondistended; Bowel sounds present  EXTREMITIES:  No clubbing, cyanosis, or edema  SKIN: No rashes or lesions                            10.0   15.40 )-----------( 271      ( 05 Mar 2019 07:23 )             32.8     03-05    136  |  101  |  21  ----------------------------<  104<H>  3.4<L>   |  28  |  0.61    Ca    8.2<L>      05 Mar 2019 07:49    MEDICATIONS  (STANDING):  ALBUTerol    0.083% 2.5 milliGRAM(s) Nebulizer every 8 hours  atorvastatin 10 milliGRAM(s) Oral at bedtime  dorzolamide 2%/timolol 0.5% Ophthalmic Solution 1 Drop(s) Both EYES every 12 hours  enoxaparin Injectable 40 milliGRAM(s) SubCutaneous daily  furosemide    Tablet 20 milliGRAM(s) Oral daily  furosemide   Injectable 40 milliGRAM(s) IV Push once  hydrochlorothiazide 25 milliGRAM(s) Oral daily  latanoprost 0.005% Ophthalmic Solution 1 Drop(s) Both EYES at bedtime  lisinopril 10 milliGRAM(s) Oral daily  pentoxifylline 400 milliGRAM(s) Oral three times a day  piperacillin/tazobactam IVPB. 3.375 Gram(s) IV Intermittent every 8 hours  tamsulosin 0.4 milliGRAM(s) Oral at bedtime    MEDICATIONS  (PRN):  ALBUTerol/ipratropium for Nebulization 3 milliLiter(s) Nebulizer every 6 hours PRN Shortness of Breath          Imaging Personally Reviewed:     [x ] YES  [ ] NO    Consultant(s) Notes Reviewed:  [x ] YES  [ ] NO    Care Discussed with Consultants/Other Providers [x ] YES  [ ] No medical contraindication for discharge

## 2019-03-05 NOTE — PROGRESS NOTE ADULT - ASSESSMENT
89M HTN, HLD, PAD and Moderate Aortic Stenosis admitted for Right IT Fracture and is post ORIF.    Acute Respiratory Failure  Secondary to volume overload and possible PNA from atelectasis. Significant Improvement with diuresis + Abx   -PE workup negative on CTA.    -Blood culture negative  -Echo done reveals mild diastolic CHF with moderate AS.   -Continue with small dose of lasix  -Initial Thoracentesis results consistent with possible CHF, Culture pending  -Pulmonary and Cardiology to follow up       Right IT Fracture S/P ORIF   Continue with pain management, DVT proph, and wound care as per Ortho.  PT/OT       HTN  Lisinopril + HCTZ    Aortic Stenosis  Maintain hemodynamics and volume.  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  Will follow up as outpatient with PMD    -multinodular goiter:  tsh, t3, t4  us thyroid pending    Disposition  Full Code/Inpatient  Discharge will be to rehab hopefully in 1-2 days as the patients respiratory has markedly improved with diuresis.

## 2019-03-06 ENCOUNTER — RESULT REVIEW (OUTPATIENT)
Age: 84
End: 2019-03-06

## 2019-03-06 LAB
ALBUMIN FLD-MCNC: 1.3 G/DL — SIGNIFICANT CHANGE UP
ANION GAP SERPL CALC-SCNC: 6 MMOL/L — SIGNIFICANT CHANGE UP (ref 5–17)
B PERT IGG+IGM PNL SER: CLEAR — SIGNIFICANT CHANGE UP
BUN SERPL-MCNC: 21 MG/DL — SIGNIFICANT CHANGE UP (ref 7–23)
CALCIUM SERPL-MCNC: 8.1 MG/DL — LOW (ref 8.4–10.5)
CHLORIDE SERPL-SCNC: 102 MMOL/L — SIGNIFICANT CHANGE UP (ref 96–108)
CO2 SERPL-SCNC: 32 MMOL/L — HIGH (ref 22–31)
COLOR FLD: YELLOW — SIGNIFICANT CHANGE UP
CREAT SERPL-MCNC: 0.75 MG/DL — SIGNIFICANT CHANGE UP (ref 0.5–1.3)
FLUID INTAKE SUBSTANCE CLASS: SIGNIFICANT CHANGE UP
FLUID SEGMENTED GRANULOCYTES: 10 % — SIGNIFICANT CHANGE UP
GLUCOSE FLD-MCNC: 123 MG/DL — SIGNIFICANT CHANGE UP
GLUCOSE SERPL-MCNC: 105 MG/DL — HIGH (ref 70–99)
GRAM STN FLD: SIGNIFICANT CHANGE UP
HCT VFR BLD CALC: 33.8 % — LOW (ref 39–50)
HGB BLD-MCNC: 10.5 G/DL — LOW (ref 13–17)
LDH SERPL L TO P-CCNC: 96 U/L — SIGNIFICANT CHANGE UP
LYMPHOCYTES # FLD: 5 % — SIGNIFICANT CHANGE UP
MCHC RBC-ENTMCNC: 26.5 PG — LOW (ref 27–34)
MCHC RBC-ENTMCNC: 31.1 GM/DL — LOW (ref 32–36)
MCV RBC AUTO: 85.4 FL — SIGNIFICANT CHANGE UP (ref 80–100)
MESOTHL CELL # FLD: 4 % — SIGNIFICANT CHANGE UP
MONOS+MACROS # FLD: 81 % — SIGNIFICANT CHANGE UP
NON-GYNECOLOGICAL CYTOLOGY STUDY: SIGNIFICANT CHANGE UP
NRBC # BLD: 0 /100 WBCS — SIGNIFICANT CHANGE UP (ref 0–0)
PH FLD: 7.57 — SIGNIFICANT CHANGE UP
PLATELET # BLD AUTO: 287 K/UL — SIGNIFICANT CHANGE UP (ref 150–400)
POTASSIUM SERPL-MCNC: 3.7 MMOL/L — SIGNIFICANT CHANGE UP (ref 3.5–5.3)
POTASSIUM SERPL-SCNC: 3.7 MMOL/L — SIGNIFICANT CHANGE UP (ref 3.5–5.3)
PROT FLD-MCNC: 2.1 G/DL — SIGNIFICANT CHANGE UP
RBC # BLD: 3.96 M/UL — LOW (ref 4.2–5.8)
RBC # FLD: 16.1 % — HIGH (ref 10.3–14.5)
RCV VOL RI: 10 /UL — HIGH (ref 0–0)
SODIUM SERPL-SCNC: 140 MMOL/L — SIGNIFICANT CHANGE UP (ref 135–145)
SPECIMEN SOURCE: SIGNIFICANT CHANGE UP
TOTAL NUCLEATED CELL COUNT, BODY FLUID: 257 /UL — SIGNIFICANT CHANGE UP
TUBE TYPE: SIGNIFICANT CHANGE UP
WBC # BLD: 13.29 K/UL — HIGH (ref 3.8–10.5)
WBC # FLD AUTO: 13.29 K/UL — HIGH (ref 3.8–10.5)

## 2019-03-06 PROCEDURE — 88108 CYTOPATH CONCENTRATE TECH: CPT | Mod: 26

## 2019-03-06 PROCEDURE — 71045 X-RAY EXAM CHEST 1 VIEW: CPT | Mod: 26,59

## 2019-03-06 PROCEDURE — 99233 SBSQ HOSP IP/OBS HIGH 50: CPT

## 2019-03-06 PROCEDURE — 32555 ASPIRATE PLEURA W/ IMAGING: CPT | Mod: RT

## 2019-03-06 PROCEDURE — 88305 TISSUE EXAM BY PATHOLOGIST: CPT | Mod: 26

## 2019-03-06 RX ADMIN — Medication 20 MILLIGRAM(S): at 05:18

## 2019-03-06 RX ADMIN — ATORVASTATIN CALCIUM 10 MILLIGRAM(S): 80 TABLET, FILM COATED ORAL at 21:19

## 2019-03-06 RX ADMIN — LISINOPRIL 10 MILLIGRAM(S): 2.5 TABLET ORAL at 05:18

## 2019-03-06 RX ADMIN — Medication 400 MILLIGRAM(S): at 21:19

## 2019-03-06 RX ADMIN — Medication 1 APPLICATION(S): at 21:19

## 2019-03-06 RX ADMIN — PIPERACILLIN AND TAZOBACTAM 25 GRAM(S): 4; .5 INJECTION, POWDER, LYOPHILIZED, FOR SOLUTION INTRAVENOUS at 09:23

## 2019-03-06 RX ADMIN — LATANOPROST 1 DROP(S): 0.05 SOLUTION/ DROPS OPHTHALMIC; TOPICAL at 21:19

## 2019-03-06 RX ADMIN — Medication 1 APPLICATION(S): at 05:18

## 2019-03-06 RX ADMIN — TAMSULOSIN HYDROCHLORIDE 0.4 MILLIGRAM(S): 0.4 CAPSULE ORAL at 21:19

## 2019-03-06 RX ADMIN — Medication 400 MILLIGRAM(S): at 05:19

## 2019-03-06 RX ADMIN — Medication 25 MILLIGRAM(S): at 05:18

## 2019-03-06 RX ADMIN — Medication 400 MILLIGRAM(S): at 13:09

## 2019-03-06 RX ADMIN — DORZOLAMIDE HYDROCHLORIDE TIMOLOL MALEATE 1 DROP(S): 20; 5 SOLUTION/ DROPS OPHTHALMIC at 21:18

## 2019-03-06 RX ADMIN — PIPERACILLIN AND TAZOBACTAM 25 GRAM(S): 4; .5 INJECTION, POWDER, LYOPHILIZED, FOR SOLUTION INTRAVENOUS at 15:39

## 2019-03-06 RX ADMIN — ALBUTEROL 2.5 MILLIGRAM(S): 90 AEROSOL, METERED ORAL at 21:00

## 2019-03-06 RX ADMIN — ALBUTEROL 2.5 MILLIGRAM(S): 90 AEROSOL, METERED ORAL at 14:35

## 2019-03-06 RX ADMIN — ENOXAPARIN SODIUM 40 MILLIGRAM(S): 100 INJECTION SUBCUTANEOUS at 09:23

## 2019-03-06 NOTE — CHART NOTE - NSCHARTNOTEFT_GEN_A_CORE
Upon Nutritional Assessment by the Registered Dietitian your patient was determined to meet criteria / has evidence of the following diagnosis/diagnoses:          [ x]  Mild Protein Calorie Malnutrition        [ ]  Moderate Protein Calorie Malnutrition        [ ] Severe Protein Calorie Malnutrition        [ ] Unspecified Protein Calorie Malnutrition        [ ] Underweight / BMI <19        [ ] Morbid Obesity / BMI > 40      Findings as based on:  •  Comprehensive nutrition assessment and consultation  •  Calorie counts (nutrient intake analysis)  •  Food acceptance and intake status from observations by staff  •  Follow up  •  Patient education  •  Intervention secondary to interdisciplinary rounds  •   concerns      Treatment:    The following diet has been recommended: regular ensure BID      PROVIDER Section:     By signing this assessment you are acknowledging and agree with the diagnosis/diagnoses assigned by the Registered Dietitian    Comments:      Assessment: Pt is a 89yo male admitted from home for right hip pain after a fall on 2/22. PMH HTN, HLD, peripheral artery disease, arthritis. Pt states he is a very picky eater; at beginning of hospital stay, he was not eating anything, but has been trying to order things he likes and force himself to eat even though he cannot taste well. States he had a big appetite at home, but could not recall typical intake. States he experienced intentional insignificant 17% wt loss from 180lb to current 150lb over past 4 years. States he dieted to lose wt but got concerned once he reached 150lb. and stopped, but has been unable to regain any wt. Performed NFPE, noted mild orbital fat loss and mild muscle loss temporalis, clavicles and shoulders. At admission, pt's wt was 149.6lb. Bed wt as of 3/6 is 143.7lb, yielding significant unintended 4% wt loss in <2 weeks. Due to NFPE findings and significant unintended 4% wt loss in <2 weeks, pt meets criteria for moderate malnutrition in the context of acute illness/injury. Offered oral supplementation, pt refused magic cup but said he would be willing to drink Vanilla Ensure. Rec adding 2 cans vanilla Ensure Enlive TID to help pt meet nutrient needs and deter further wt loss. RD to follow closely.

## 2019-03-06 NOTE — PROGRESS NOTE ADULT - SUBJECTIVE AND OBJECTIVE BOX
CC.  Right hip pain  HPI.  Patient reports right hip pain is controlled.  Cough, and SOB are improving.  Feels much better after second thoracentesis  Offers no other complaints                Constitutional: No fever, fatigue or weight loss.  Skin: No rash.  Eyes: No recent vision problems or eye pain.  ENT: No congestion, ear pain, or sore throat.  Endocrine: No thyroid problems.  Cardiovascular: No chest pain or palpation.  Respiratory: No  wheezing.  Gastrointestinal: No abdominal pain, nausea, vomiting, or diarrhea.  Genitourinary: No dysuria.  Musculoskeletal: No joint swelling.  Neurologic: No headache.    Vital Signs Last 24 Hrs  T(C): 36.4 (06 Mar 2019 07:46), Max: 36.7 (05 Mar 2019 23:29)  T(F): 97.5 (06 Mar 2019 07:46), Max: 98 (05 Mar 2019 23:29)  HR: 92 (06 Mar 2019 08:35) (83 - 92)  BP: 139/72 (06 Mar 2019 08:35) (103/59 - 139/72)  BP(mean): --  RR: 18 (06 Mar 2019 08:35) (16 - 18)  SpO2: 100% (06 Mar 2019 08:35) (86% - 100%)        PHYSICAL EXAM-  GENERAL: Chronic ill appearing male   HEAD:  Atraumatic, Normocephalic  EYES: EOMI, PERRLA, conjunctiva and sclera clear  NECK: Supple, No JVD, Normal thyroid  NERVOUS SYSTEM:  Alert & Oriented moving all extremites  CHEST/LUNG: Min Rhonchi sound with good air entry.  No retractions or accessory muscle usage  HEART: Regular rate and rhythm; No murmurs, rubs, or gallops  ABDOMEN: Soft, Nontender, Nondistended; Bowel sounds present  EXTREMITIES:  No clubbing, cyanosis, or edema  SKIN: No rashes or lesions                            10.5   13.29 )-----------( 287      ( 06 Mar 2019 07:46 )             33.8     03-06    140  |  102  |  21  ----------------------------<  105<H>  3.7   |  32<H>  |  0.75    Ca    8.1<L>      06 Mar 2019 07:46                  MEDICATIONS  (STANDING):  ALBUTerol    0.083% 2.5 milliGRAM(s) Nebulizer every 8 hours  atorvastatin 10 milliGRAM(s) Oral at bedtime  betamethasone valerate 0.1% Cream 1 Application(s) Topical two times a day  dorzolamide 2%/timolol 0.5% Ophthalmic Solution 1 Drop(s) Both EYES every 12 hours  enoxaparin Injectable 40 milliGRAM(s) SubCutaneous daily  furosemide    Tablet 20 milliGRAM(s) Oral daily  hydrochlorothiazide 25 milliGRAM(s) Oral daily  latanoprost 0.005% Ophthalmic Solution 1 Drop(s) Both EYES at bedtime  lisinopril 10 milliGRAM(s) Oral daily  pentoxifylline 400 milliGRAM(s) Oral three times a day  piperacillin/tazobactam IVPB. 3.375 Gram(s) IV Intermittent every 8 hours  tamsulosin 0.4 milliGRAM(s) Oral at bedtime    MEDICATIONS  (PRN):  ALBUTerol/ipratropium for Nebulization 3 milliLiter(s) Nebulizer every 6 hours PRN Shortness of Breath    Imaging Personally Reviewed:     [x ] YES  [ ] NO    Consultant(s) Notes Reviewed:  [x ] YES  [ ] NO    Care Discussed with Consultants/Other Providers [x ] YES  [ ] NO

## 2019-03-06 NOTE — CONSULT NOTE ADULT - SUBJECTIVE AND OBJECTIVE BOX
HPI:  89M with HTN, HLD, peripheral artery disease, arthritis, who initially presented to the hospital   with right hip pain after a fall found to have a right intertrochanteric fracture Sp ORIF 2/23/19.  Course in hospital complicated by acute respiratory distress post op on 2/26/19 with hypoxia  and desaturation. Started on Zosyn for poss pneumonia.  CT Chest showed large bilateral   pleural effusion with compressive atelectasis. Also possible left lung mass. He is sp left lung   thoracentesis on 3/4/19 and right today. Afebrile. Resp status stable. Pleural fluid cytology neg  for malignant cells.     Infectious Disease consult was called to evaluate pt and for antibiotic management.      Past Medical & Surgical Hx:  PAST MEDICAL & SURGICAL HISTORY:  Peripheral artery disease  Arthritis  Hyperlipidemia, unspecified hyperlipidemia type  Essential hypertension  History of back surgery: not spinal surgery, possibly lipoma?  H/O hernia repair    Social History:   + former heavy smoker (quit about 19 years ago, was a 2 ppk for 50 yrs  + very rare etoh use (approx 1-2 beer/month  - denies any illegal drug use  - lives alone    FAMILY HISTORY:  Family history of colon cancer: twin brother with colon CA  Family history of MI (myocardial infarction) (Mother): at age 72      Allergies  No Known Allergies    Home Medications:  enoxaparin: 40 milligram(s) subcutaneous once a day (25 Feb 2019 10:35)  hydroCHLOROthiazide 25 mg oral tablet: 1 tab(s) orally once a day (22 Feb 2019 23:25)  Lipitor 10 mg oral tablet: 1 tab(s) orally once a day (22 Feb 2019 23:25)  oxyCODONE 5 mg oral tablet: 1 tab(s) orally every 3 hours, As needed, Mild Pain (1 - 3) (25 Feb 2019 10:30)  Prinivil 40 mg oral tablet: 1 tab(s) orally once a day (22 Feb 2019 23:25)  tamsulosin 0.4 mg oral capsule: 1 cap(s) orally once a day (22 Feb 2019 23:25)  TRENtal 400 mg oral tablet, extended release: 1 tab(s) orally 3 times a day (22 Feb 2019 23:25)    Current Inpatient Medications :    ANTIBIOTICS:   piperacillin/tazobactam IVPB. 3.375 Gram(s) IV Intermittent every 8 hours      OTHER RELEVANT MEDICATIONS :  ALBUTerol    0.083% 2.5 milliGRAM(s) Nebulizer every 8 hours  ALBUTerol/ipratropium for Nebulization 3 milliLiter(s) Nebulizer every 6 hours PRN  atorvastatin 10 milliGRAM(s) Oral at bedtime  betamethasone valerate 0.1% Cream 1 Application(s) Topical two times a day  dorzolamide 2%/timolol 0.5% Ophthalmic Solution 1 Drop(s) Both EYES every 12 hours  enoxaparin Injectable 40 milliGRAM(s) SubCutaneous daily  furosemide    Tablet 20 milliGRAM(s) Oral daily  hydrochlorothiazide 25 milliGRAM(s) Oral daily  latanoprost 0.005% Ophthalmic Solution 1 Drop(s) Both EYES at bedtime  lisinopril 10 milliGRAM(s) Oral daily  pentoxifylline 400 milliGRAM(s) Oral three times a day  tamsulosin 0.4 milliGRAM(s) Oral at bedtime    ROS:  CONSTITUTIONAL:  Negative fever or chills  EYES:  Negative  blurry vision or double vision  CARDIOVASCULAR:  Negative for chest pain or palpitations  RESPIRATORY:  Negative for cough, wheezing, or SOB   GASTROINTESTINAL:  Negative for nausea, vomiting, diarrhea, constipation, or abdominal pain  GENITOURINARY:  Negative frequency, urgency , dysuria or hematuria   NEUROLOGIC:  No headache, confusion, dizziness, lightheadedness  All other systems were reviewed and are negative      I&O's Detail    05 Mar 2019 07:01  -  06 Mar 2019 07:00  --------------------------------------------------------  IN:    Oral Fluid: 120 mL  Total IN: 120 mL    OUT:    Voided: 1050 mL  Total OUT: 1050 mL    Total NET: -930 mL      06 Mar 2019 07:01  -  06 Mar 2019 23:10  --------------------------------------------------------  IN:  Total IN: 0 mL    OUT:    Voided: 300 mL  Total OUT: 300 mL    Total NET: -300 mL          Physical Exam:  Vital Signs Last 24 Hrs  T(C): 36.6 (06 Mar 2019 15:23), Max: 36.7 (05 Mar 2019 23:29)  T(F): 97.9 (06 Mar 2019 15:23), Max: 98 (05 Mar 2019 23:29)  HR: 93 (06 Mar 2019 21:01) (70 - 94)  BP: 95/56 (06 Mar 2019 15:23) (95/56 - 139/72)  RR: 17 (06 Mar 2019 15:23) (16 - 18)  SpO2: 97% (06 Mar 2019 21:01) (94% - 100%)      General: no acute distress weak   Eyes: sclera anicteric, pupils equal and reactive to light  ENMT: buccal mucosa moist, pharynx not injected  Neck: supple, trachea midline  Lungs: Decreased, no wheeze/rhonchi  Cardiovascular: regular rate and rhythm, S1 S2  Abdomen: soft, nontender, no organomegaly present, bowel sounds normal  Neurological:  alert and oriented x3, Cranial Nerves II-XII grossly intact  Skin:no increased ecchymosis/petechiae/purpura  Lymph Nodes: no palpable cervical/supraclavicular lymph nodes enlargements  Extremities: right hip drsg c/d/i    Labs:                         10.5   13.29 )-----------( 287      ( 06 Mar 2019 07:46 )             33.8   03-06    140  |  102  |  21  ----------------------------<  105<H>  3.7   |  32<H>  |  0.75    Ca    8.1<L>      06 Mar 2019 07:46        RECENT CULTURES:    Culture - Body Fluid with Gram Stain (collected 06 Mar 2019 16:51)  Source: Pleural Fl Pleural Fluid  Gram Stain (06 Mar 2019 22:02):    No polymorphonuclear cells seen per low power field    No organisms seen per oil power field by cytocentrifuge    Culture - Fungal, Body Fluid (collected 05 Mar 2019 00:29)  Source: .Body Fluid Pleural Fluid  Preliminary Report (05 Mar 2019 08:22):    Testing in progress    Culture - Body Fluid with Gram Stain (collected 05 Mar 2019 00:29)  Source: .Body Fluid Pleural Fluid  Gram Stain (05 Mar 2019 02:33):    polymorphonuclear leukocytes seen per low power field    No organisms seen per oil power field    by cytocentrifuge  Preliminary Report (05 Mar 2019 18:09):    No growth to date    RADIOLOGY & ADDITIONAL STUDIES:    EXAM:  CT ANGIO CHEST (W)AW IC                            PROCEDURE DATE:  02/27/2019        INTERPRETATION:  Clinical information: Postop, shortness of breath    Axial images obtained, coronal and sagittal images computer reformatted.   90 cc of Omnipaque 350 intravenously administered, 10 cc discarded.    MIP images obtained.    No evidence of pulmonary embolism. Normal contrast enhancement of   pulmonary arterial system.    Large bilateral pleural effusions with associated compressive   atelectasis. No pericardial effusion or thoracic aortic aneurysm.   Enlarged preaortic lymph node measuring 1.7 cm short axis measurement.   Prominent nodes also visible in the pretracheal region. Follow-up   evaluation recommended after effusions and atelectasis have resolved.   Hilar regions obscured by the effusions and atelectasis.    Evidence of centrilobular emphysema.    In the left upper lobe, a mass is present which measures 2.5 cm maximum   span. Density readings indicate that  this represents a solid lesion but   again, after effusions and atelectasis have resolved, a follow-up study   is advised to confirm.    No evidence of adrenal lesion. The spleen is not enlarged.    Hypodensity visible anterior aspect right hepatic lobe series 5 image   161, etiology indeterminate due to  artifacts. Kidneys not totally   included, no hydronephrotic changes evident. Multiple left renal cysts   present.    No acute appearing osseous abnormalities.    IMPRESSION:  1. No evidence of pulmonary embolism  2. Large pleural effusions with compressive atelectasis  3. Centrilobular emphysema  4. Suspicious soft tissue mass in the left upper lobe ,could represent a   neoplasm. See above report.    EXAM:  XR CHEST PORTABLE ROUTINE 1V                            PROCEDURE DATE:  03/06/2019          INTERPRETATION:  Chest one view    HISTORY: Post thoracentesis    COMPARISON STUDY: 3/4/2019    Frontal expiratory view of the chest shows theheart to be similar in   size. The lungs show new left base infiltrate or atelectasis and there is   no evidence of pneumothorax nor pleural effusion..    IMPRESSION:  No pneumothorax. Left base markings as described. Follow-up study is   recommendedas clinically warranted.      EXAM:  US TTE W DOPPLER COMPLETE                                  PROCEDURE DATE:  02/23/2019          INTERPRETATION:  Ordering Physician: DIGNA AMATO [id number]    Indication: Preoperative evaluation. Aortic stenosis.    Technician: Lorena Rosales    Study Quality: Very limited.     Height: 5 feet 9 inches  Weight: 150 pounds  Blood Pressure: 111/56    MEASUREMENTS  Poor image quality precludes accurate measurements    FINDINGS  Left Ventricle: The left ventricle was seen in a very limited fashion in   the subcostal window only.  Grossly normal left ventricular size and   overall systolic function. LVEF estimated at 60% (visual assessment).    Mild diastolic dysfunction (stage I).  Right Ventricle: Grossly normal right ventricular size and function.  Left Atrium: Grossly normal left atrium.  Right Atrium: Grossly normal right atrium.  Mitral Valve: Mitral annular calcification. Calcified mitral leaflets   with decreased diastolic opening. The mean transmitral gradient is   approximately 7.6 mmHg, consistent with moderate mitral stenosis.  Aortic Valve: Calcified aortic valve with decreased opening. The peak and   mean transaortic gradients are approximately 56 mmHg and 27 mmHg,   consistent with moderate aortic stenosis.  Tricuspid Valve: Tricuspid valve not well visualized. Pulmonary artery   systolic pressure estimated at 30 mmHg, assuming a right atrial pressure   of 3 mmHg, which is normal.  Pulmonic Valve: Pulmonic valve not visualized.  Pericardium/Pleura: No pericardialeffusion.      CONCLUSIONS:  1. Technically very limited study with poor acoustic windows.  2. The left ventricle was seen in a very limited fashion in the subcostal   window only.  Grossly normal left ventricular size and overall systolic   function. LVEF estimated at 60% (visual assessment).  Mild diastolic   dysfunction (stage I).  3. Moderate mitral stenosis.  4. Moderate aortic stenosis.    Assessment :   89M with HTN, HLD, peripheral artery disease, arthritis with right intertrochanteric fracture Sp   ORIF 2/23/19.,Course in hospital complicated by acute respiratory distress post op on 2/26/19   with hypoxia,and desaturation sec to large bilateral pleural effusion with compressive atelectasis.   Started on Zosyn for poss pneumonia on 2/26/19  Also possible left lung mass.   Sp left lung thoracentesis on 3/4/19 and right today  Prob component of CHF with valvular heart dz  Overall stable    Plan :   Already on Zosyn for 8 days  Stop Zosyn  Pulm toileting  Increase activity    D/w Hospitalist    Continue with present regiment .  Approptiate use of antibiotics and adverse effects reviewed.      I have discussed the above plan of care with patient/family in detail. They expressed understanding of the treatment plan . Risks, benefits and alternatives discussed in detail. I have asked if they have any questions or concerns and appropriately addressed them to the best of my ability .      > 45 minutes spent in direct patient care reviewing  the notes, lab data/ imaging , discussion with multidisciplinary team. All questions were addressed and answered to the best of my capacity .    Thank you for allowing me to participate in the care of your patient .      Zenaida Martinez MD  Infectious Disease  [phone]

## 2019-03-06 NOTE — BRIEF OPERATIVE NOTE - OPERATION/FINDINGS
885mL clear yellow fluid
685mL clear yellow fluid. CXR: no ptx.
intertrochanteric fracture rihhjt hip

## 2019-03-06 NOTE — PROGRESS NOTE ADULT - ASSESSMENT
89M HTN, HLD, PAD and Moderate Aortic Stenosis admitted for Right IT Fracture and is post ORIF.    Acute Respiratory Failure  Secondary to volume overload and possible PNA from atelectasis. Significant Improvement with diuresis + Abx   -PE workup negative on CTA.    -Blood culture negative  -Echo done reveals mild diastolic CHF with moderate AS.   -Continue with  lasix  -S/P Thoracentesis results consistent with possible CHF, Culture pending  -Pulmonary and Cardiology to follow up       Right IT Fracture S/P ORIF   Continue with pain management, DVT proph, and wound care as per Ortho.  PT/OT       HTN  Lisinopril + HCTZ    Aortic Stenosis  Maintain hemodynamics and volume.  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  Will follow up as outpatient with PMD    -multinodular goiter:  tsh, t3, t4 ok  us thyroid shows multinodular goiter    Disposition  Full Code/Inpatient

## 2019-03-06 NOTE — PROGRESS NOTE ADULT - SUBJECTIVE AND OBJECTIVE BOX
POST OPERATIVE DAY #: 11    89y Male  s/p  Right hip ORIF                SUBJECTIVE: Patient seen and examined at bedside. alert and oriented x3. states he is doing well, going or another thoracocentesis     Pain:  well controlled     Pain scale:  4 /10  Denies CP, SOB, N/V/D, weakness, numbness   No new complains     OBJECTIVE:     Vital Signs Last 24 Hrs  T(C): 36.4 (06 Mar 2019 07:46), Max: 36.7 (05 Mar 2019 23:29)  T(F): 97.5 (06 Mar 2019 07:46), Max: 98 (05 Mar 2019 23:29)  HR: 83 (06 Mar 2019 07:46) (83 - 87)  BP: 111/65 (06 Mar 2019 07:46) (103/59 - 138/75)  BP(mean): --  RR: 18 (06 Mar 2019 07:46) (16 - 18)  SpO2: 98% (06 Mar 2019 07:46) (86% - 100%)    Affected extremity: RLE         Dressing: clean/dry/intact                          Sensation: intact to light touch          Motor exam:   5/ 5 Tibialis Anterior/Gastrocnemius-Soleus, EHL/FHL         warm, well-perfused; capillary refill < 3 seconds         negative calf tenderness B/L LE    LABS:                        10.5   13.29 )-----------( 287      ( 06 Mar 2019 07:46 )             33.8     03-06    140  |  102  |  21  ----------------------------<  105<H>  3.7   |  32<H>  |  0.75    Ca    8.1<L>      06 Mar 2019 07:46          MEDICATIONS:  Infection prophylaxis:  piperacillin/tazobactam IVPB. 3.375 Gram(s) IV Intermittent every 8 hours    Pain management:    DVT prophylaxis:   enoxaparin Injectable 40 milliGRAM(s) SubCutaneous daily  pentoxifylline 400 milliGRAM(s) Oral three times a day      RADIOLOGY & ADDITIONAL STUDIES:    ASSESSMENT AND PLAN:     - Analgesic pain control  - DVT prophylaxis:   Lovenox 40mg daily    SCDs       - PT/OT: Weight Bearing Status:  Weight bearing as tolerated, OOBTC       -  Incentive spirometry  - IVF  - Advance diet as tolerated  - Hospitalist is following  - Pulmonary following    -  Follow up labs  -  Disposition:  Subacute Rehab

## 2019-03-06 NOTE — BRIEF OPERATIVE NOTE - POST-OP DX
Closed fracture of right hip  02/23/2019    Active  Colt Smith  Pleural effusion on left  03/04/2019    Active  Tan Maya  Pleural effusion on right  03/06/2019    Active  Tan Maya
Closed fracture of right hip  02/23/2019    Active  Colt Smith
Closed fracture of right hip  02/23/2019    Active  Colt Smith  Pleural effusion on left  03/04/2019    Active  Tan Maya

## 2019-03-06 NOTE — BRIEF OPERATIVE NOTE - PROCEDURE
<<-----Click on this checkbox to enter Procedure Thoracentesis of right pleural cavity using catheter with ultrasound guidance  03/06/2019    Active  SUEAN

## 2019-03-06 NOTE — CHART NOTE - NSCHARTNOTEFT_GEN_A_CORE
Assessment:     Factors impacting intake: [ ] none [ ] nausea  [ ] vomiting [ ] diarrhea [ ] constipation  [ ]chewing problems [ ] swallowing issues  [X] other: persistent lack of appetite and dysguesia    Diet Presciption: Diet, Regular (03-03-19 @ 19:29)    Intake: Pt reports poor appetite due to dysguesia. States he's consumed ~25-50% of meals for past 3 days.    Current Weight:   % Weight Change    Pertinent Medications: MEDICATIONS  (STANDING):  ALBUTerol    0.083% 2.5 milliGRAM(s) Nebulizer every 8 hours  atorvastatin 10 milliGRAM(s) Oral at bedtime  betamethasone valerate 0.1% Cream 1 Application(s) Topical two times a day  dorzolamide 2%/timolol 0.5% Ophthalmic Solution 1 Drop(s) Both EYES every 12 hours  enoxaparin Injectable 40 milliGRAM(s) SubCutaneous daily  furosemide    Tablet 20 milliGRAM(s) Oral daily  hydrochlorothiazide 25 milliGRAM(s) Oral daily  latanoprost 0.005% Ophthalmic Solution 1 Drop(s) Both EYES at bedtime  lisinopril 10 milliGRAM(s) Oral daily  pentoxifylline 400 milliGRAM(s) Oral three times a day  piperacillin/tazobactam IVPB. 3.375 Gram(s) IV Intermittent every 8 hours  tamsulosin 0.4 milliGRAM(s) Oral at bedtime    MEDICATIONS  (PRN):  ALBUTerol/ipratropium for Nebulization 3 milliLiter(s) Nebulizer every 6 hours PRN Shortness of Breath    Pertinent Labs: 03-06 Na140 mmol/L Glu 105 mg/dL<H> K+ 3.7 mmol/L Cr  0.75 mg/dL BUN 21 mg/dL     CAPILLARY BLOOD GLUCOSE        Skin: WDL, no edema or pressure injuries noted at this time.    Estimated Needs:   [X ] no change since previous assessment  [ ] recalculated:     Previous Nutrition Diagnosis:   [ ] Inadequate Energy Intake [X ]Inadequate Oral Intake [ ] Excessive Energy Intake   [ ] Underweight [ ] Increased Nutrient Needs [ ] Overweight/Obesity   [ ] Altered GI Function [ ] Unintended Weight Loss [ ] Food & Nutrition Related Knowledge Deficit [ ] Malnutrition     Nutrition Diagnosis is [ X] ongoing  [ ] resolved [ ] not applicable     New Nutrition Diagnosis: [X ] not applicable       Interventions: Continue with current "regular" diet order as tolerated by pt. Add 1 can Vanilla Ensure TID.  Recommend  [ ] Change Diet To:  [ ] Nutrition Supplement  [ ] Nutrition Support  [ ] Other:     Monitoring and Evaluation:   [ ] PO intake [ x ] Tolerance to diet prescription [ x ] weights [ x ] labs[ x ] follow up per protocol  [ ] other: Assessment: Pt is a 89yo male admitted from home for right hip pain after a fall on 2/22. PMH HTN, HLD, peripheral artery disease, arthritis. Pt states he is a very picky eater; at beginning of hospital stay, he was not eating anything, but has been trying to order things he likes and force himself to eat even though he cannot taste well. States he had a big appetite at home, but could not recall typical intake. States he experienced intentional insignificant 17% wt loss from 180lb to current 150lb over past 4 years. States he dieted to lose wt but got concerned once he reached 150lb. and stopped, but has been unable to regain any wt. Performed NFPE, noted mild orbital fat loss and mild muscle loss temporalis, clavicles and shoulders. At admission, pt's wt was 149.6lb. Bed wt as of 3/6 is 143.7lb, yielding significant unintended 4% wt loss in <2 weeks. Due to NFPE findings and significant unintended 4% wt loss in <2 weeks, pt meets criteria for moderate malnutrition in the context of acute illness/injury. Offered oral supplementation, pt refused magic cup but said he would be willing to drink Vanilla Ensure. Rec adding 2 cans vanilla Ensure Enlive TID to help pt meet nutrient needs and deter further wt loss. RD to follow closely.     Factors impacting intake: [ ] none [ ] nausea  [ ] vomiting [ ] diarrhea [ ] constipation  [ ]chewing problems [ ] swallowing issues  [X] other: persistent lack of appetite, dysguesia, disinterest in food    Diet Presciption: Diet, Regular (03-03-19 @ 19:29)    Intake: Pt reports poor appetite due to dysguesia. States he's consumed ~25-50% of meals for past 3 days.    Current Weight: 143.7lb (65.2kg)  4% unintended Weight loss since admission 2/22.    Pertinent Medications: MEDICATIONS  (STANDING):  ALBUTerol    0.083% 2.5 milliGRAM(s) Nebulizer every 8 hours  atorvastatin 10 milliGRAM(s) Oral at bedtime  betamethasone valerate 0.1% Cream 1 Application(s) Topical two times a day  dorzolamide 2%/timolol 0.5% Ophthalmic Solution 1 Drop(s) Both EYES every 12 hours  enoxaparin Injectable 40 milliGRAM(s) SubCutaneous daily  furosemide    Tablet 20 milliGRAM(s) Oral daily  hydrochlorothiazide 25 milliGRAM(s) Oral daily  latanoprost 0.005% Ophthalmic Solution 1 Drop(s) Both EYES at bedtime  lisinopril 10 milliGRAM(s) Oral daily  pentoxifylline 400 milliGRAM(s) Oral three times a day  piperacillin/tazobactam IVPB. 3.375 Gram(s) IV Intermittent every 8 hours  tamsulosin 0.4 milliGRAM(s) Oral at bedtime    MEDICATIONS  (PRN):  ALBUTerol/ipratropium for Nebulization 3 milliLiter(s) Nebulizer every 6 hours PRN Shortness of Breath    Pertinent Labs: 03-06 Na140 mmol/L Glu 105 mg/dL<H> K+ 3.7 mmol/L Cr  0.75 mg/dL BUN 21 mg/dL     CAPILLARY BLOOD GLUCOSE        Skin: WDL, no edema or pressure injuries noted at this time.    Estimated Needs:   [X ] no change since previous assessment  [ ] recalculated:     Previous Nutrition Diagnosis:   [ ] Inadequate Energy Intake [X ]Inadequate Oral Intake [ ] Excessive Energy Intake   [ ] Underweight [ ] Increased Nutrient Needs [ ] Overweight/Obesity   [ ] Altered GI Function [ ] Unintended Weight Loss [ ] Food & Nutrition Related Knowledge Deficit [ ] Malnutrition     Nutrition Diagnosis is [ X] ongoing  [ ] resolved [ ] not applicable     New Nutrition Diagnosis: [ ] not applicable [X] Malnutrition  Mild malnutrition in the context of acute illness or injury related to closed fracture of neck of right femur as evidenced by NFPE findings of mild orbital fat loss and mild muscle loss temporalis, clavicles and shoulders, and significant unintended 4% wt loss in <2 weeks.      Interventions: Continue with current "regular" diet order as tolerated by pt. Add 2 can Vanilla Ensure TID.  Recommend  [ ] Change Diet To:  [X ] Nutrition Supplement: 2 can Vanilla Ensure TID  [ ] Nutrition Support  [ ] Other:     Monitoring and Evaluation:   [X ] PO intake [ x ] Tolerance to diet prescription [ x ] weights [ x ] labs[ x ] follow up per protocol  [ ] other: Assessment: Pt is a 89yo male admitted from home for right hip pain after a fall on 2/22. PMH HTN, HLD, peripheral artery disease, arthritis. Pt states he is a very picky eater; at beginning of hospital stay, he was not eating anything, but has been trying to order things he likes and force himself to eat even though he cannot taste well. States he had a big appetite at home, but could not recall typical intake. States he experienced intentional insignificant 17% wt loss from 180lb to current 150lb over past 4 years. States he dieted to lose wt but got concerned once he reached 150lb. and stopped, but has been unable to regain any wt. Performed NFPE, noted mild orbital fat loss and mild muscle loss temporalis, clavicles and shoulders. At admission, pt's wt was 149.6lb. Bed wt as of 3/6 is 143.7lb, yielding significant unintended 4% wt loss in <2 weeks. Due to NFPE findings and significant unintended 4% wt loss in <2 weeks, pt meets criteria for moderate malnutrition in the context of acute illness/injury. Offered oral supplementation, pt refused magic cup but said he would be willing to drink Vanilla Ensure. Rec adding 2 cans vanilla Ensure Enlive TID to help pt meet nutrient needs and deter further wt loss. RD to follow closely.     Factors impacting intake: [ ] none [ ] nausea  [ ] vomiting [ ] diarrhea [ ] constipation  [ ]chewing problems [ ] swallowing issues  [X] other: persistent lack of appetite, dysguesia, disinterest in food    Diet Presciption: Diet, Regular (03-03-19 @ 19:29)    Intake: Pt reports poor appetite due to dysguesia. States he's consumed ~25-50% of meals for past 3 days.    Current Weight: 143.7lb (65.2kg)  4% unintended Weight loss since admission 2/22.    Pertinent Medications: MEDICATIONS  (STANDING):  ALBUTerol    0.083% 2.5 milliGRAM(s) Nebulizer every 8 hours  atorvastatin 10 milliGRAM(s) Oral at bedtime  betamethasone valerate 0.1% Cream 1 Application(s) Topical two times a day  dorzolamide 2%/timolol 0.5% Ophthalmic Solution 1 Drop(s) Both EYES every 12 hours  enoxaparin Injectable 40 milliGRAM(s) SubCutaneous daily  furosemide    Tablet 20 milliGRAM(s) Oral daily  hydrochlorothiazide 25 milliGRAM(s) Oral daily  latanoprost 0.005% Ophthalmic Solution 1 Drop(s) Both EYES at bedtime  lisinopril 10 milliGRAM(s) Oral daily  pentoxifylline 400 milliGRAM(s) Oral three times a day  piperacillin/tazobactam IVPB. 3.375 Gram(s) IV Intermittent every 8 hours  tamsulosin 0.4 milliGRAM(s) Oral at bedtime    MEDICATIONS  (PRN):  ALBUTerol/ipratropium for Nebulization 3 milliLiter(s) Nebulizer every 6 hours PRN Shortness of Breath    Pertinent Labs: 03-06 Na140 mmol/L Glu 105 mg/dL<H> K+ 3.7 mmol/L Cr  0.75 mg/dL BUN 21 mg/dL     CAPILLARY BLOOD GLUCOSE        Skin: WDL, no edema or pressure injuries noted at this time.    Estimated Needs:   [X ] no change since previous assessment  [ ] recalculated:     Previous Nutrition Diagnosis:   [ ] Inadequate Energy Intake [X ]Inadequate Oral Intake [ ] Excessive Energy Intake   [ ] Underweight [ ] Increased Nutrient Needs [ ] Overweight/Obesity   [ ] Altered GI Function [ ] Unintended Weight Loss [ ] Food & Nutrition Related Knowledge Deficit [ ] Malnutrition     Nutrition Diagnosis is [ X] ongoing  [ ] resolved [ ] not applicable     New Nutrition Diagnosis: [ ] not applicable [X] Malnutrition  Mild malnutrition in the context of acute illness or injury related to closed fracture of neck of right femur as evidenced by NFPE findings of mild orbital fat loss and mild muscle loss temporalis, clavicles and shoulders, and significant unintended 4% wt loss in <2 weeks.      Interventions: Continue with current "regular" diet order as tolerated by pt. Add 2 can Vanilla Ensure Enlive TID to help pt meet nutrient needs and deter further wt loss.  Recommend  [ ] Change Diet To:  [X ] Nutrition Supplement: 2 can Vanilla Ensure TID  [ ] Nutrition Support  [ ] Other:     Monitoring and Evaluation:   [X ] PO intake [ x ] Tolerance to diet prescription [ x ] weights [ x ] labs[ x ] follow up per protocol  [ ] other:

## 2019-03-07 VITALS
TEMPERATURE: 98 F | RESPIRATION RATE: 16 BRPM | OXYGEN SATURATION: 95 % | DIASTOLIC BLOOD PRESSURE: 56 MMHG | HEART RATE: 89 BPM | SYSTOLIC BLOOD PRESSURE: 113 MMHG

## 2019-03-07 LAB
ANION GAP SERPL CALC-SCNC: 6 MMOL/L — SIGNIFICANT CHANGE UP (ref 5–17)
BUN SERPL-MCNC: 20 MG/DL — SIGNIFICANT CHANGE UP (ref 7–23)
CALCIUM SERPL-MCNC: 8.1 MG/DL — LOW (ref 8.4–10.5)
CHLORIDE SERPL-SCNC: 102 MMOL/L — SIGNIFICANT CHANGE UP (ref 96–108)
CO2 SERPL-SCNC: 31 MMOL/L — SIGNIFICANT CHANGE UP (ref 22–31)
CREAT SERPL-MCNC: 0.72 MG/DL — SIGNIFICANT CHANGE UP (ref 0.5–1.3)
GLUCOSE SERPL-MCNC: 110 MG/DL — HIGH (ref 70–99)
HCT VFR BLD CALC: 34.4 % — LOW (ref 39–50)
HGB BLD-MCNC: 10.7 G/DL — LOW (ref 13–17)
MCHC RBC-ENTMCNC: 26.6 PG — LOW (ref 27–34)
MCHC RBC-ENTMCNC: 31.1 GM/DL — LOW (ref 32–36)
MCV RBC AUTO: 85.6 FL — SIGNIFICANT CHANGE UP (ref 80–100)
NIGHT BLUE STAIN TISS: SIGNIFICANT CHANGE UP
NRBC # BLD: 0 /100 WBCS — SIGNIFICANT CHANGE UP (ref 0–0)
PLATELET # BLD AUTO: 278 K/UL — SIGNIFICANT CHANGE UP (ref 150–400)
POTASSIUM SERPL-MCNC: 3.6 MMOL/L — SIGNIFICANT CHANGE UP (ref 3.5–5.3)
POTASSIUM SERPL-SCNC: 3.6 MMOL/L — SIGNIFICANT CHANGE UP (ref 3.5–5.3)
RBC # BLD: 4.02 M/UL — LOW (ref 4.2–5.8)
RBC # FLD: 16.5 % — HIGH (ref 10.3–14.5)
SODIUM SERPL-SCNC: 139 MMOL/L — SIGNIFICANT CHANGE UP (ref 135–145)
SPECIMEN SOURCE: SIGNIFICANT CHANGE UP
WBC # BLD: 15.37 K/UL — HIGH (ref 3.8–10.5)
WBC # FLD AUTO: 15.37 K/UL — HIGH (ref 3.8–10.5)

## 2019-03-07 PROCEDURE — 80053 COMPREHEN METABOLIC PANEL: CPT

## 2019-03-07 PROCEDURE — 94640 AIRWAY INHALATION TREATMENT: CPT

## 2019-03-07 PROCEDURE — 87116 MYCOBACTERIA CULTURE: CPT

## 2019-03-07 PROCEDURE — 84157 ASSAY OF PROTEIN OTHER: CPT

## 2019-03-07 PROCEDURE — 84100 ASSAY OF PHOSPHORUS: CPT

## 2019-03-07 PROCEDURE — 71275 CT ANGIOGRAPHY CHEST: CPT

## 2019-03-07 PROCEDURE — 71045 X-RAY EXAM CHEST 1 VIEW: CPT

## 2019-03-07 PROCEDURE — 87205 SMEAR GRAM STAIN: CPT

## 2019-03-07 PROCEDURE — 87075 CULTR BACTERIA EXCEPT BLOOD: CPT

## 2019-03-07 PROCEDURE — 71045 X-RAY EXAM CHEST 1 VIEW: CPT | Mod: 26

## 2019-03-07 PROCEDURE — 94667 MNPJ CHEST WALL 1ST: CPT

## 2019-03-07 PROCEDURE — 86900 BLOOD TYPING SEROLOGIC ABO: CPT

## 2019-03-07 PROCEDURE — 83605 ASSAY OF LACTIC ACID: CPT

## 2019-03-07 PROCEDURE — 76775 US EXAM ABDO BACK WALL LIM: CPT

## 2019-03-07 PROCEDURE — 87206 SMEAR FLUORESCENT/ACID STAI: CPT

## 2019-03-07 PROCEDURE — 97165 OT EVAL LOW COMPLEX 30 MIN: CPT

## 2019-03-07 PROCEDURE — 70450 CT HEAD/BRAIN W/O DYE: CPT

## 2019-03-07 PROCEDURE — 84436 ASSAY OF TOTAL THYROXINE: CPT

## 2019-03-07 PROCEDURE — 85027 COMPLETE CBC AUTOMATED: CPT

## 2019-03-07 PROCEDURE — 76536 US EXAM OF HEAD AND NECK: CPT

## 2019-03-07 PROCEDURE — 97530 THERAPEUTIC ACTIVITIES: CPT

## 2019-03-07 PROCEDURE — 80048 BASIC METABOLIC PNL TOTAL CA: CPT

## 2019-03-07 PROCEDURE — 97110 THERAPEUTIC EXERCISES: CPT

## 2019-03-07 PROCEDURE — 86901 BLOOD TYPING SEROLOGIC RH(D): CPT

## 2019-03-07 PROCEDURE — 97162 PT EVAL MOD COMPLEX 30 MIN: CPT

## 2019-03-07 PROCEDURE — 84443 ASSAY THYROID STIM HORMONE: CPT

## 2019-03-07 PROCEDURE — 87070 CULTURE OTHR SPECIMN AEROBIC: CPT

## 2019-03-07 PROCEDURE — 87040 BLOOD CULTURE FOR BACTERIA: CPT

## 2019-03-07 PROCEDURE — 85730 THROMBOPLASTIN TIME PARTIAL: CPT

## 2019-03-07 PROCEDURE — 32555 ASPIRATE PLEURA W/ IMAGING: CPT

## 2019-03-07 PROCEDURE — 93306 TTE W/DOPPLER COMPLETE: CPT

## 2019-03-07 PROCEDURE — 76000 FLUOROSCOPY <1 HR PHYS/QHP: CPT

## 2019-03-07 PROCEDURE — 83615 LACTATE (LD) (LDH) ENZYME: CPT

## 2019-03-07 PROCEDURE — 86850 RBC ANTIBODY SCREEN: CPT

## 2019-03-07 PROCEDURE — 85610 PROTHROMBIN TIME: CPT

## 2019-03-07 PROCEDURE — 36415 COLL VENOUS BLD VENIPUNCTURE: CPT

## 2019-03-07 PROCEDURE — 87102 FUNGUS ISOLATION CULTURE: CPT

## 2019-03-07 PROCEDURE — 83986 ASSAY PH BODY FLUID NOS: CPT

## 2019-03-07 PROCEDURE — 72192 CT PELVIS W/O DYE: CPT

## 2019-03-07 PROCEDURE — 93005 ELECTROCARDIOGRAM TRACING: CPT

## 2019-03-07 PROCEDURE — 94668 MNPJ CHEST WALL SBSQ: CPT

## 2019-03-07 PROCEDURE — 72125 CT NECK SPINE W/O DYE: CPT

## 2019-03-07 PROCEDURE — 87015 SPECIMEN INFECT AGNT CONCNTJ: CPT

## 2019-03-07 PROCEDURE — 94760 N-INVAS EAR/PLS OXIMETRY 1: CPT

## 2019-03-07 PROCEDURE — 99239 HOSP IP/OBS DSCHRG MGMT >30: CPT

## 2019-03-07 PROCEDURE — 84480 ASSAY TRIIODOTHYRONINE (T3): CPT

## 2019-03-07 PROCEDURE — 94664 DEMO&/EVAL PT USE INHALER: CPT

## 2019-03-07 PROCEDURE — 97116 GAIT TRAINING THERAPY: CPT

## 2019-03-07 PROCEDURE — 96374 THER/PROPH/DIAG INJ IV PUSH: CPT

## 2019-03-07 PROCEDURE — 84145 PROCALCITONIN (PCT): CPT

## 2019-03-07 PROCEDURE — 89051 BODY FLUID CELL COUNT: CPT

## 2019-03-07 PROCEDURE — 83735 ASSAY OF MAGNESIUM: CPT

## 2019-03-07 PROCEDURE — 97535 SELF CARE MNGMENT TRAINING: CPT

## 2019-03-07 PROCEDURE — 73610 X-RAY EXAM OF ANKLE: CPT

## 2019-03-07 PROCEDURE — 73502 X-RAY EXAM HIP UNI 2-3 VIEWS: CPT

## 2019-03-07 PROCEDURE — 73552 X-RAY EXAM OF FEMUR 2/>: CPT

## 2019-03-07 PROCEDURE — 99285 EMERGENCY DEPT VISIT HI MDM: CPT | Mod: 25

## 2019-03-07 PROCEDURE — C1769: CPT

## 2019-03-07 PROCEDURE — 82042 OTHER SOURCE ALBUMIN QUAN EA: CPT

## 2019-03-07 PROCEDURE — 82945 GLUCOSE OTHER FLUID: CPT

## 2019-03-07 PROCEDURE — C1889: CPT

## 2019-03-07 RX ORDER — ALBUTEROL 90 UG/1
1 AEROSOL, METERED ORAL
Qty: 0 | Refills: 0 | COMMUNITY
Start: 2019-03-07

## 2019-03-07 RX ORDER — LATANOPROST 0.05 MG/ML
1 SOLUTION/ DROPS OPHTHALMIC; TOPICAL
Qty: 0 | Refills: 0 | COMMUNITY
Start: 2019-03-07

## 2019-03-07 RX ORDER — DORZOLAMIDE HYDROCHLORIDE TIMOLOL MALEATE 20; 5 MG/ML; MG/ML
1 SOLUTION/ DROPS OPHTHALMIC
Qty: 0 | Refills: 0 | COMMUNITY
Start: 2019-03-07

## 2019-03-07 RX ORDER — IPRATROPIUM/ALBUTEROL SULFATE 18-103MCG
3 AEROSOL WITH ADAPTER (GRAM) INHALATION
Qty: 0 | Refills: 0 | COMMUNITY
Start: 2019-03-07

## 2019-03-07 RX ORDER — FUROSEMIDE 40 MG
1 TABLET ORAL
Qty: 0 | Refills: 0 | COMMUNITY
Start: 2019-03-07

## 2019-03-07 RX ADMIN — LISINOPRIL 10 MILLIGRAM(S): 2.5 TABLET ORAL at 08:25

## 2019-03-07 RX ADMIN — Medication 400 MILLIGRAM(S): at 13:49

## 2019-03-07 RX ADMIN — Medication 400 MILLIGRAM(S): at 05:44

## 2019-03-07 RX ADMIN — ENOXAPARIN SODIUM 40 MILLIGRAM(S): 100 INJECTION SUBCUTANEOUS at 08:25

## 2019-03-07 RX ADMIN — Medication 20 MILLIGRAM(S): at 05:44

## 2019-03-07 RX ADMIN — ALBUTEROL 2.5 MILLIGRAM(S): 90 AEROSOL, METERED ORAL at 07:34

## 2019-03-07 RX ADMIN — Medication 25 MILLIGRAM(S): at 08:25

## 2019-03-07 RX ADMIN — Medication 1 APPLICATION(S): at 05:44

## 2019-03-07 RX ADMIN — ALBUTEROL 2.5 MILLIGRAM(S): 90 AEROSOL, METERED ORAL at 15:11

## 2019-03-07 RX ADMIN — DORZOLAMIDE HYDROCHLORIDE TIMOLOL MALEATE 1 DROP(S): 20; 5 SOLUTION/ DROPS OPHTHALMIC at 09:47

## 2019-03-07 NOTE — PROGRESS NOTE ADULT - SUBJECTIVE AND OBJECTIVE BOX
JAK HERNANDEZ is a 89yMale , patient examined and chart reviewed.     INTERVAL HPI/ OVERNIGHT EVENTS:   Feels well. Afebrile.   No events.    PAST MEDICAL & SURGICAL HISTORY:  Peripheral artery disease  Arthritis  Hyperlipidemia, unspecified hyperlipidemia type  Essential hypertension  History of back surgery: not spinal surgery, possibly lipoma?  H/O hernia repair    For details regarding the patient's social history, family history, and other miscellaneous elements, please refer the initial infectious diseases consultation and/or the admitting history and physical examination for this admission.    ROS:  CONSTITUTIONAL:  Negative fever or chills  EYES:  Negative  blurry vision or double vision  CARDIOVASCULAR:  Negative for chest pain or palpitations  RESPIRATORY:  Negative for cough, wheezing, or SOB   GASTROINTESTINAL:  Negative for nausea, vomiting, diarrhea, constipation, or abdominal pain  GENITOURINARY:  Negative frequency, urgency or dysuria  NEUROLOGIC:  No headache, confusion, dizziness, lightheadedness  All other systems were reviewed and are negative       Current inpatient medications :    ANTIBIOTICS/RELEVANT:    ALBUTerol    0.083% 2.5 milliGRAM(s) Nebulizer every 8 hours  ALBUTerol/ipratropium for Nebulization 3 milliLiter(s) Nebulizer every 6 hours PRN  atorvastatin 10 milliGRAM(s) Oral at bedtime  betamethasone valerate 0.1% Cream 1 Application(s) Topical two times a day  dorzolamide 2%/timolol 0.5% Ophthalmic Solution 1 Drop(s) Both EYES every 12 hours  enoxaparin Injectable 40 milliGRAM(s) SubCutaneous daily  furosemide    Tablet 20 milliGRAM(s) Oral daily  hydrochlorothiazide 25 milliGRAM(s) Oral daily  latanoprost 0.005% Ophthalmic Solution 1 Drop(s) Both EYES at bedtime  lisinopril 10 milliGRAM(s) Oral daily  pentoxifylline 400 milliGRAM(s) Oral three times a day  tamsulosin 0.4 milliGRAM(s) Oral at bedtime      Objective:    03-06 @ 07:01  -  03-07 @ 07:00  --------------------------------------------------------  IN: 60 mL / OUT: 550 mL / NET: -490 mL      T(C): 36.7 (03-07-19 @ 07:28), Max: 36.7 (03-06-19 @ 23:03)  HR: 90 (03-07-19 @ 15:12) (80 - 96)  BP: 126/64 (03-07-19 @ 07:28) (95/63 - 126/64)  RR: 18 (03-07-19 @ 07:28) (16 - 18)  SpO2: 98% (03-07-19 @ 15:12) (94% - 100%)  Wt(kg): --      Physical Exam:  General: in no acute distress weak  Eyes: sclera anicteric, pupils equal and reactive to light  ENMT: buccal mucosa moist, pharynx not injected  Neck: supple, trachea midline  Lungs: Decreased no wheeze/rhonchi  Cardiovascular: regular rate and rhythm, S1 S2  Abdomen: soft, nontender, no organomegaly present, bowel sounds normal  Neurological: alert and oriented x3, Cranial Nerves II-XII grossly intact  Skin: no increased ecchymosis/petechiae/purpura  Lymph Nodes: no palpable cervical/supraclavicular lymph nodes enlargements  Extremities:  trace edema Right hip surgical site c/d/i no erythema    LABS:                      10.7   15.37 )-----------( 278      ( 07 Mar 2019 07:55 )             34.4       03-07    139  |  102  |  20  ----------------------------<  110<H>  3.6   |  31  |  0.72    Ca    8.1<L>      07 Mar 2019 07:55      MICROBIOLOGY:    Culture - Body Fluid with Gram Stain (collected 06 Mar 2019 16:51)  Source: Pleural Fl Pleural Fluid  Gram Stain (06 Mar 2019 22:02):    No polymorphonuclear cells seen per low power field    No organisms seen per oil power field by cytocentrifuge    Culture - Fungal, Body Fluid (collected 06 Mar 2019 16:50)  Source: Pleural Fl Pleural Fluid  Preliminary Report (07 Mar 2019 10:02):    Testing in progress    Culture - Acid Fast - Body Fluid w/Smear (collected 06 Mar 2019 16:50)  Source: Pleural Fl Pleural Fluid    Culture - Fungal, Body Fluid (collected 05 Mar 2019 00:29)  Source: .Body Fluid Pleural Fluid  Preliminary Report (05 Mar 2019 08:22):    Testing in progress    Culture - Body Fluid with Gram Stain (collected 05 Mar 2019 00:29)  Source: .Body Fluid Pleural Fluid  Gram Stain (05 Mar 2019 02:33):    polymorphonuclear leukocytes seen per low power field    No organisms seen per oil power field    by cytocentrifuge  Preliminary Report (05 Mar 2019 18:09):    No growth to date    RADIOLOGY & ADDITIONAL STUDIES:    EXAM:  CT ANGIO CHEST (W)AW IC                            PROCEDURE DATE:  02/27/2019        INTERPRETATION:  Clinical information: Postop, shortness of breath    Axial images obtained, coronal and sagittal images computer reformatted.   90 cc of Omnipaque 350 intravenously administered, 10 cc discarded.    MIP images obtained.    No evidence of pulmonary embolism. Normal contrast enhancement of   pulmonary arterial system.    Large bilateral pleural effusions with associated compressive   atelectasis. No pericardial effusion or thoracic aortic aneurysm.   Enlarged preaortic lymph node measuring 1.7 cm short axis measurement.   Prominent nodes also visible in the pretracheal region. Follow-up   evaluation recommended after effusions and atelectasis have resolved.   Hilar regions obscured by the effusions and atelectasis.    Evidence of centrilobular emphysema.    In the left upper lobe, a mass is present which measures 2.5 cm maximum   span. Density readings indicate that  this represents a solid lesion but   again, after effusions and atelectasis have resolved, a follow-up study   is advised to confirm.    No evidence of adrenal lesion. The spleen is not enlarged.    Hypodensity visible anterior aspect right hepatic lobe series 5 image   161, etiology indeterminate due to  artifacts. Kidneys not totally   included, no hydronephrotic changes evident. Multiple left renal cysts   present.    No acute appearing osseous abnormalities.    IMPRESSION:  1. No evidence of pulmonary embolism  2. Large pleural effusions with compressive atelectasis  3. Centrilobular emphysema  4. Suspicious soft tissue mass in the left upper lobe ,could represent a   neoplasm. See above report.    EXAM:  XR CHEST PORTABLE ROUTINE 1V                            PROCEDURE DATE:  03/06/2019          INTERPRETATION:  Chest one view    HISTORY: Post thoracentesis    COMPARISON STUDY: 3/4/2019    Frontal expiratory view of the chest shows theheart to be similar in   size. The lungs show new left base infiltrate or atelectasis and there is   no evidence of pneumothorax nor pleural effusion..    IMPRESSION:  No pneumothorax. Left base markings as described. Follow-up study is   recommendedas clinically warranted.      EXAM:  US TTE W DOPPLER COMPLETE                                  PROCEDURE DATE:  02/23/2019          INTERPRETATION:  Ordering Physician: DIGNA AMATO [id number]    Indication: Preoperative evaluation. Aortic stenosis.    Technician: Lorena Rosales    Study Quality: Very limited.     Height: 5 feet 9 inches  Weight: 150 pounds  Blood Pressure: 111/56    MEASUREMENTS  Poor image quality precludes accurate measurements    FINDINGS  Left Ventricle: The left ventricle was seen in a very limited fashion in   the subcostal window only.  Grossly normal left ventricular size and   overall systolic function. LVEF estimated at 60% (visual assessment).    Mild diastolic dysfunction (stage I).  Right Ventricle: Grossly normal right ventricular size and function.  Left Atrium: Grossly normal left atrium.  Right Atrium: Grossly normal right atrium.  Mitral Valve: Mitral annular calcification. Calcified mitral leaflets   with decreased diastolic opening. The mean transmitral gradient is   approximately 7.6 mmHg, consistent with moderate mitral stenosis.  Aortic Valve: Calcified aortic valve with decreased opening. The peak and   mean transaortic gradients are approximately 56 mmHg and 27 mmHg,   consistent with moderate aortic stenosis.  Tricuspid Valve: Tricuspid valve not well visualized. Pulmonary artery   systolic pressure estimated at 30 mmHg, assuming a right atrial pressure   of 3 mmHg, which is normal.  Pulmonic Valve: Pulmonic valve not visualized.  Pericardium/Pleura: No pericardialeffusion.      CONCLUSIONS:  1. Technically very limited study with poor acoustic windows.  2. The left ventricle was seen in a very limited fashion in the subcostal   window only.  Grossly normal left ventricular size and overall systolic   function. LVEF estimated at 60% (visual assessment).  Mild diastolic   dysfunction (stage I).  3. Moderate mitral stenosis.  4. Moderate aortic stenosis.    Assessment :   89M with HTN, HLD, peripheral artery disease, arthritis with right intertrochanteric fracture Sp   ORIF 2/23/19.,Course in hospital complicated by acute respiratory distress post op on 2/26/19   with hypoxia,and desaturation sec to large bilateral pleural effusion with compressive atelectasis.   Started on Zosyn for poss pneumonia on 2/26/19  Also possible left lung mass.   Sp left lung thoracentesis on 3/4/19 and right today  Prob component of CHF with valvular heart dz  Leukocytosis multifactorial  Overall stable    Plan :   Off Zosyn  Monitor off antibiotics  Pulm toileting  Increase activity  Dc planning to rehab    Continue with present regiment.  Appropriate use of antibiotics and adverse effects reviewed.      I have discussed the above plan of care with patient/ family in detail. They expressed understanding of the  treatment plan . Risks, benefits and alternatives discussed in detail. I have asked if they have any questions or concerns and appropriately addressed them to the best of my ability .    > 35 minutes were spent in direct patient care reviewing notes, medications ,labs data/ imaging , discussion with multidisciplinary team.    Thank you for allowing me to participate in care of your patient .    Zenaida Martinez MD  Infectious Disease  [phone]

## 2019-03-07 NOTE — PROGRESS NOTE ADULT - ASSESSMENT
89M HTN, HLD, PAD and Moderate Aortic Stenosis admitted for Right IT Fracture and is post ORIF.    Acute Respiratory Failure  Secondary to volume overload and possible PNA from atelectasis. Significant Improvement with diuresis + Abx   -PE workup negative on CTA.    -Blood culture negative  -Echo done reveals mild diastolic CHF with moderate AS.   -Continue with  lasix  -S/P Thoracentesis results consistent with possible CHF, Culture negative  -Pulmonary and Cardiology to follow up   -Ok for discharge as per pulmonary      Right IT Fracture S/P ORIF   Continue with pain management, DVT proph, and wound care as per Ortho.  PT/OT       HTN  Lisinopril + HCTZ    Aortic Stenosis  Maintain hemodynamics and volume.  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  Will follow up as outpatient with PMD    -multinodular goiter:  tsh, t3, t4 ok  us thyroid shows multinodular goiter    Disposition  Full Code/Inpatient

## 2019-03-07 NOTE — PROGRESS NOTE ADULT - SUBJECTIVE AND OBJECTIVE BOX
POST OPERATIVE DAY #: 12    89y Male  s/p  Right hip ORIF                SUBJECTIVE: Patient seen and examined at bedside. Pt is alert and oriented, much improved    Pain:  well controlled     Pain scale:   5/10  Denies CP, SOB, N/V/D, weakness, numbness   No new complains     OBJECTIVE:     Vital Signs Last 24 Hrs  T(C): 36.7 (07 Mar 2019 07:28), Max: 36.7 (06 Mar 2019 23:03)  T(F): 98.1 (07 Mar 2019 07:28), Max: 98.1 (06 Mar 2019 23:03)  HR: 96 (07 Mar 2019 10:40) (70 - 96)  BP: 126/64 (07 Mar 2019 07:28) (95/56 - 126/64)  BP(mean): --  RR: 18 (07 Mar 2019 07:28) (16 - 18)  SpO2: 94% (07 Mar 2019 10:40) (94% - 100%)    Affected extremity: RLE         Dressing: removed, incision I/C/D                      Sensation: intact to light touch          Motor exam: 5  / 5 Tibialis Anterior/Gastrocnemius-Soleus, EHL/FHL         warm, well-perfused; capillary refill < 3 seconds         negative calf tenderness B/L LE    LABS:                        10.7   15.37 )-----------( 278      ( 07 Mar 2019 07:55 )             34.4     03-07    139  |  102  |  20  ----------------------------<  110<H>  3.6   |  31  |  0.72    Ca    8.1<L>      07 Mar 2019 07:55          MEDICATIONS:    DVT prophylaxis:   enoxaparin Injectable 40 milliGRAM(s) SubCutaneous daily  pentoxifylline 400 milliGRAM(s) Oral three times a day      RADIOLOGY & ADDITIONAL STUDIES:    ASSESSMENT AND PLAN:     - CHF: continue Lasix, off oxygen saturating well. elevate head of the bed  - d/c Zosyn as per ID  - pulm following   - Analgesic pain control  - DVT prophylaxis: Lovenox 40mg daily    SCDs       - PT/OT: Weight Bearing Status:  Weight bearing as tolerated, OOBTC          -  Incentive spirometry  - IVF  - Advance diet as tolerated  - Hospitalist is following  -  Follow up labs  -  Disposition:      Subacute Rehab

## 2019-03-07 NOTE — PROGRESS NOTE ADULT - SUBJECTIVE AND OBJECTIVE BOX
CC.  Right hip pain  HPI.  Patient reports right hip pain is controlled.  Cough, and SOB resolved.  Feels much better after second thoracentesis  Offers no other complaints                Constitutional: No fever, fatigue or weight loss.  Skin: No rash.  Eyes: No recent vision problems or eye pain.  ENT: No congestion, ear pain, or sore throat.  Endocrine: No thyroid problems.  Cardiovascular: No chest pain or palpation.  Respiratory: No Cough, SOB,  wheezing.  Gastrointestinal: No abdominal pain, nausea, vomiting, or diarrhea.  Genitourinary: No dysuria.  Musculoskeletal: No joint swelling.  Neurologic: No headache.    Vital Signs Last 24 Hrs  T(C): 36.7 (07 Mar 2019 07:28), Max: 36.7 (06 Mar 2019 23:03)  T(F): 98.1 (07 Mar 2019 07:28), Max: 98.1 (06 Mar 2019 23:03)  HR: 96 (07 Mar 2019 10:40) (70 - 96)  BP: 126/64 (07 Mar 2019 07:28) (95/56 - 126/64)  BP(mean): --  RR: 18 (07 Mar 2019 07:28) (16 - 18)  SpO2: 94% (07 Mar 2019 10:40) (94% - 100%)        PHYSICAL EXAM-  GENERAL: Chronic ill appearing male   HEAD:  Atraumatic, Normocephalic  EYES: EOMI, PERRLA, conjunctiva and sclera clear  NECK: Supple, No JVD, Normal thyroid  NERVOUS SYSTEM:  Alert & Oriented moving all extremites  CHEST/LUNG: Min Rhonchi sound with good air entry.  No retractions or accessory muscle usage  HEART: Regular rate and rhythm; No murmurs, rubs, or gallops  ABDOMEN: Soft, Nontender, Nondistended; Bowel sounds present  EXTREMITIES:  No clubbing, cyanosis, or edema  SKIN: No rashes or lesions                            10.7   15.37 )-----------( 278      ( 07 Mar 2019 07:55 )             34.4     03-07    139  |  102  |  20  ----------------------------<  110<H>  3.6   |  31  |  0.72    Ca    8.1<L>      07 Mar 2019 07:55      MEDICATIONS  (STANDING):  ALBUTerol    0.083% 2.5 milliGRAM(s) Nebulizer every 8 hours  atorvastatin 10 milliGRAM(s) Oral at bedtime  betamethasone valerate 0.1% Cream 1 Application(s) Topical two times a day  dorzolamide 2%/timolol 0.5% Ophthalmic Solution 1 Drop(s) Both EYES every 12 hours  enoxaparin Injectable 40 milliGRAM(s) SubCutaneous daily  furosemide    Tablet 20 milliGRAM(s) Oral daily  hydrochlorothiazide 25 milliGRAM(s) Oral daily  latanoprost 0.005% Ophthalmic Solution 1 Drop(s) Both EYES at bedtime  lisinopril 10 milliGRAM(s) Oral daily  pentoxifylline 400 milliGRAM(s) Oral three times a day  tamsulosin 0.4 milliGRAM(s) Oral at bedtime    MEDICATIONS  (PRN):  ALBUTerol/ipratropium for Nebulization 3 milliLiter(s) Nebulizer every 6 hours PRN Shortness of Breath      Imaging Personally Reviewed:     [x ] YES  [ ] NO    Consultant(s) Notes Reviewed:  [x ] YES  [ ] NO    Care Discussed with Consultants/Other Providers [x ] YES  [ ] No medical contraindication for discharge

## 2019-03-07 NOTE — PROGRESS NOTE ADULT - REASON FOR ADMISSION
right hip pain after a fall
Right IT Fracture
right hip pain after a fall
right hip pain after a fall, s/p Right Hip ORIF (Gamma nail)
Right IT Fracture
Right IT Fracture
right hip pain after a fall

## 2019-03-07 NOTE — PROGRESS NOTE ADULT - PROVIDER SPECIALTY LIST ADULT
Cardiology
Cardiology
Hospitalist
Infectious Disease
Orthopedics
Pulmonology
Orthopedics
Hospitalist
Hospitalist

## 2019-03-09 LAB
CULTURE RESULTS: SIGNIFICANT CHANGE UP
SPECIMEN SOURCE: SIGNIFICANT CHANGE UP

## 2019-03-11 LAB
CULTURE RESULTS: SIGNIFICANT CHANGE UP
SPECIMEN SOURCE: SIGNIFICANT CHANGE UP

## 2019-04-03 LAB
CULTURE RESULTS: SIGNIFICANT CHANGE UP
SPECIMEN SOURCE: SIGNIFICANT CHANGE UP

## 2019-04-06 LAB
CULTURE RESULTS: SIGNIFICANT CHANGE UP
SPECIMEN SOURCE: SIGNIFICANT CHANGE UP

## 2019-04-19 ENCOUNTER — INPATIENT (INPATIENT)
Facility: HOSPITAL | Age: 84
LOS: 3 days | Discharge: NURSING, REHAB OR LONG-TERM CARE | DRG: 640 | End: 2019-04-23
Attending: FAMILY MEDICINE | Admitting: FAMILY MEDICINE
Payer: MEDICARE

## 2019-04-19 VITALS
HEART RATE: 96 BPM | TEMPERATURE: 99 F | SYSTOLIC BLOOD PRESSURE: 79 MMHG | HEIGHT: 67 IN | DIASTOLIC BLOOD PRESSURE: 35 MMHG | WEIGHT: 130.07 LBS | OXYGEN SATURATION: 96 % | RESPIRATION RATE: 15 BRPM

## 2019-04-19 DIAGNOSIS — Z98.890 OTHER SPECIFIED POSTPROCEDURAL STATES: Chronic | ICD-10-CM

## 2019-04-19 DIAGNOSIS — Z96.7 PRESENCE OF OTHER BONE AND TENDON IMPLANTS: Chronic | ICD-10-CM

## 2019-04-19 DIAGNOSIS — E86.0 DEHYDRATION: ICD-10-CM

## 2019-04-19 LAB
ALBUMIN SERPL ELPH-MCNC: 2.8 G/DL — LOW (ref 3.3–5)
ALP SERPL-CCNC: 94 U/L — SIGNIFICANT CHANGE UP (ref 30–120)
ALT FLD-CCNC: 21 U/L DA — SIGNIFICANT CHANGE UP (ref 10–60)
ANION GAP SERPL CALC-SCNC: 11 MMOL/L — SIGNIFICANT CHANGE UP (ref 5–17)
APTT BLD: 32.8 SEC — SIGNIFICANT CHANGE UP (ref 28.5–37)
AST SERPL-CCNC: 26 U/L — SIGNIFICANT CHANGE UP (ref 10–40)
BASOPHILS # BLD AUTO: 0.04 K/UL — SIGNIFICANT CHANGE UP (ref 0–0.2)
BASOPHILS NFR BLD AUTO: 0.3 % — SIGNIFICANT CHANGE UP (ref 0–2)
BILIRUB SERPL-MCNC: 0.5 MG/DL — SIGNIFICANT CHANGE UP (ref 0.2–1.2)
BUN SERPL-MCNC: 107 MG/DL — HIGH (ref 7–23)
CALCIUM SERPL-MCNC: 8.8 MG/DL — SIGNIFICANT CHANGE UP (ref 8.4–10.5)
CHLORIDE SERPL-SCNC: 95 MMOL/L — LOW (ref 96–108)
CO2 SERPL-SCNC: 27 MMOL/L — SIGNIFICANT CHANGE UP (ref 22–31)
CREAT SERPL-MCNC: 1.94 MG/DL — HIGH (ref 0.5–1.3)
EOSINOPHIL # BLD AUTO: 0.07 K/UL — SIGNIFICANT CHANGE UP (ref 0–0.5)
EOSINOPHIL NFR BLD AUTO: 0.6 % — SIGNIFICANT CHANGE UP (ref 0–6)
GLUCOSE SERPL-MCNC: 164 MG/DL — HIGH (ref 70–99)
HCT VFR BLD CALC: 34.9 % — LOW (ref 39–50)
HGB BLD-MCNC: 11.1 G/DL — LOW (ref 13–17)
IMM GRANULOCYTES NFR BLD AUTO: 0.5 % — SIGNIFICANT CHANGE UP (ref 0–1.5)
INR BLD: 1.09 RATIO — SIGNIFICANT CHANGE UP (ref 0.88–1.16)
LYMPHOCYTES # BLD AUTO: 1.38 K/UL — SIGNIFICANT CHANGE UP (ref 1–3.3)
LYMPHOCYTES # BLD AUTO: 11.3 % — LOW (ref 13–44)
MCHC RBC-ENTMCNC: 25.8 PG — LOW (ref 27–34)
MCHC RBC-ENTMCNC: 31.8 GM/DL — LOW (ref 32–36)
MCV RBC AUTO: 81.2 FL — SIGNIFICANT CHANGE UP (ref 80–100)
MONOCYTES # BLD AUTO: 0.64 K/UL — SIGNIFICANT CHANGE UP (ref 0–0.9)
MONOCYTES NFR BLD AUTO: 5.2 % — SIGNIFICANT CHANGE UP (ref 2–14)
NEUTROPHILS # BLD AUTO: 10.07 K/UL — HIGH (ref 1.8–7.4)
NEUTROPHILS NFR BLD AUTO: 82.1 % — HIGH (ref 43–77)
NRBC # BLD: 0 /100 WBCS — SIGNIFICANT CHANGE UP (ref 0–0)
PLATELET # BLD AUTO: 217 K/UL — SIGNIFICANT CHANGE UP (ref 150–400)
POTASSIUM SERPL-MCNC: 4.4 MMOL/L — SIGNIFICANT CHANGE UP (ref 3.5–5.3)
POTASSIUM SERPL-SCNC: 4.4 MMOL/L — SIGNIFICANT CHANGE UP (ref 3.5–5.3)
PROT SERPL-MCNC: 6.6 G/DL — SIGNIFICANT CHANGE UP (ref 6–8.3)
PROTHROM AB SERPL-ACNC: 11.9 SEC — SIGNIFICANT CHANGE UP (ref 10–12.9)
RBC # BLD: 4.3 M/UL — SIGNIFICANT CHANGE UP (ref 4.2–5.8)
RBC # FLD: 17.1 % — HIGH (ref 10.3–14.5)
SODIUM SERPL-SCNC: 133 MMOL/L — LOW (ref 135–145)
WBC # BLD: 12.26 K/UL — HIGH (ref 3.8–10.5)
WBC # FLD AUTO: 12.26 K/UL — HIGH (ref 3.8–10.5)

## 2019-04-19 PROCEDURE — 99285 EMERGENCY DEPT VISIT HI MDM: CPT

## 2019-04-19 PROCEDURE — 76770 US EXAM ABDO BACK WALL COMP: CPT | Mod: 26

## 2019-04-19 PROCEDURE — 93010 ELECTROCARDIOGRAM REPORT: CPT

## 2019-04-19 PROCEDURE — 71045 X-RAY EXAM CHEST 1 VIEW: CPT | Mod: 26

## 2019-04-19 PROCEDURE — 99223 1ST HOSP IP/OBS HIGH 75: CPT | Mod: AI

## 2019-04-19 RX ORDER — SODIUM CHLORIDE 9 MG/ML
1000 INJECTION INTRAMUSCULAR; INTRAVENOUS; SUBCUTANEOUS
Qty: 0 | Refills: 0 | Status: DISCONTINUED | OUTPATIENT
Start: 2019-04-19 | End: 2019-04-20

## 2019-04-19 RX ORDER — DOCUSATE SODIUM 100 MG
100 CAPSULE ORAL THREE TIMES A DAY
Qty: 0 | Refills: 0 | Status: DISCONTINUED | OUTPATIENT
Start: 2019-04-19 | End: 2019-04-23

## 2019-04-19 RX ORDER — SODIUM CHLORIDE 9 MG/ML
1000 INJECTION INTRAMUSCULAR; INTRAVENOUS; SUBCUTANEOUS ONCE
Qty: 0 | Refills: 0 | Status: COMPLETED | OUTPATIENT
Start: 2019-04-19 | End: 2019-04-19

## 2019-04-19 RX ORDER — SENNA PLUS 8.6 MG/1
2 TABLET ORAL AT BEDTIME
Qty: 0 | Refills: 0 | Status: DISCONTINUED | OUTPATIENT
Start: 2019-04-19 | End: 2019-04-23

## 2019-04-19 RX ORDER — ENOXAPARIN SODIUM 100 MG/ML
30 INJECTION SUBCUTANEOUS DAILY
Qty: 0 | Refills: 0 | Status: DISCONTINUED | OUTPATIENT
Start: 2019-04-19 | End: 2019-04-23

## 2019-04-19 RX ORDER — LATANOPROST 0.05 MG/ML
1 SOLUTION/ DROPS OPHTHALMIC; TOPICAL AT BEDTIME
Qty: 0 | Refills: 0 | Status: DISCONTINUED | OUTPATIENT
Start: 2019-04-19 | End: 2019-04-23

## 2019-04-19 RX ORDER — DORZOLAMIDE HYDROCHLORIDE TIMOLOL MALEATE 20; 5 MG/ML; MG/ML
1 SOLUTION/ DROPS OPHTHALMIC EVERY 12 HOURS
Qty: 0 | Refills: 0 | Status: DISCONTINUED | OUTPATIENT
Start: 2019-04-19 | End: 2019-04-23

## 2019-04-19 RX ORDER — ATORVASTATIN CALCIUM 80 MG/1
10 TABLET, FILM COATED ORAL AT BEDTIME
Qty: 0 | Refills: 0 | Status: DISCONTINUED | OUTPATIENT
Start: 2019-04-19 | End: 2019-04-23

## 2019-04-19 RX ORDER — ACETAMINOPHEN 500 MG
650 TABLET ORAL EVERY 6 HOURS
Qty: 0 | Refills: 0 | Status: DISCONTINUED | OUTPATIENT
Start: 2019-04-19 | End: 2019-04-23

## 2019-04-19 RX ORDER — TAMSULOSIN HYDROCHLORIDE 0.4 MG/1
0.4 CAPSULE ORAL AT BEDTIME
Qty: 0 | Refills: 0 | Status: DISCONTINUED | OUTPATIENT
Start: 2019-04-19 | End: 2019-04-23

## 2019-04-19 RX ORDER — IPRATROPIUM/ALBUTEROL SULFATE 18-103MCG
3 AEROSOL WITH ADAPTER (GRAM) INHALATION EVERY 6 HOURS
Qty: 0 | Refills: 0 | Status: DISCONTINUED | OUTPATIENT
Start: 2019-04-19 | End: 2019-04-23

## 2019-04-19 RX ORDER — ONDANSETRON 8 MG/1
4 TABLET, FILM COATED ORAL EVERY 6 HOURS
Qty: 0 | Refills: 0 | Status: DISCONTINUED | OUTPATIENT
Start: 2019-04-19 | End: 2019-04-23

## 2019-04-19 RX ADMIN — ATORVASTATIN CALCIUM 10 MILLIGRAM(S): 80 TABLET, FILM COATED ORAL at 22:12

## 2019-04-19 RX ADMIN — Medication 100 MILLIGRAM(S): at 22:11

## 2019-04-19 RX ADMIN — SODIUM CHLORIDE 1000 MILLILITER(S): 9 INJECTION INTRAMUSCULAR; INTRAVENOUS; SUBCUTANEOUS at 18:28

## 2019-04-19 RX ADMIN — LATANOPROST 1 DROP(S): 0.05 SOLUTION/ DROPS OPHTHALMIC; TOPICAL at 22:11

## 2019-04-19 RX ADMIN — ENOXAPARIN SODIUM 30 MILLIGRAM(S): 100 INJECTION SUBCUTANEOUS at 22:12

## 2019-04-19 RX ADMIN — TAMSULOSIN HYDROCHLORIDE 0.4 MILLIGRAM(S): 0.4 CAPSULE ORAL at 22:11

## 2019-04-19 RX ADMIN — SODIUM CHLORIDE 1000 MILLILITER(S): 9 INJECTION INTRAMUSCULAR; INTRAVENOUS; SUBCUTANEOUS at 20:30

## 2019-04-19 NOTE — ED PROVIDER NOTE - CARE PLAN
Principal Discharge DX:	Dehydration  Secondary Diagnosis:	Failure to thrive in adult  Secondary Diagnosis:	Acute kidney injury

## 2019-04-19 NOTE — H&P ADULT - NSICDXPASTSURGICALHX_GEN_ALL_CORE_FT
PAST SURGICAL HISTORY:  H/O hernia repair     History of back surgery not spinal surgery, possibly lipoma?

## 2019-04-19 NOTE — H&P ADULT - HISTORY OF PRESENT ILLNESS
Patient is 89 yo male with hx of pleural effusion, HTN, and HLD presenting with low BP, and ARF.  Patient noticed to have Low BP, and Renal function worsen, patient was instructed to come to the hospital for further evaluation, and treatment.  Patient reports generalized weakness, and poor appetites      denies any other symptoms

## 2019-04-19 NOTE — H&P ADULT - ASSESSMENT
Patient is 89 yo male presenting with     1. Acute renal failure.  Seems to be secondary to duirectic, dehydration, and decrease po intake.  Will obtain renal us to r/o structure abnormality, and to R/o Urinary retention  -Continue with IVF, and Monitor renal function  -Hold lasix, and HCTZ    2. Low BP  -Possible due to dehydration.  No sign of infection  -CXR shows no acute process  -Will obtain UA, and Blood culture  -Hold antihypertensive medications for now    3. BPH.  continue with flomax    4. HLD.  Continue with statin    5.  weakness  -PT consult    Plan of care was discussed with patient,  in great details, All questions were answered to their satisfication.  Seems to understand, and in agreement  IMPROVE VTE Individual Risk Assessment    RISK                                                          Points  [] Previous VTE                                           3  [] Thrombophilia                                        2  [] Lower limb paralysis                              2   [] Current Cancer                                       2   [] Immobilization > 24 hrs                        1  [] ICU/CCU stay > 24 hours                       1  [] Age > 60                                                   1    IMPROVE VTE Score:          2  Lovenox

## 2019-04-19 NOTE — ED PROVIDER NOTE - CLINICAL SUMMARY MEDICAL DECISION MAKING FREE TEXT BOX
89 y/o sent in by PMD Dr. Hassan for PADMINI, hypotensive, dehydrated, will order labs, IV fluids, EKG, CXR, likely admission.

## 2019-04-19 NOTE — H&P ADULT - NSICDXPASTMEDICALHX_GEN_ALL_CORE_FT
PAST MEDICAL HISTORY:  Arthritis     Essential hypertension     Hyperlipidemia, unspecified hyperlipidemia type     Peripheral artery disease

## 2019-04-19 NOTE — H&P ADULT - NEUROLOGICAL DETAILS
deep reflexes intact/alert and oriented x 3/responds to verbal commands/responds to pain/cranial nerves intact

## 2019-04-19 NOTE — H&P ADULT - GASTROINTESTINAL DETAILS
bowel sounds normal/normal/no guarding/no distention/no masses palpable/soft/nontender/no rigidity/no bruit/no rebound tenderness

## 2019-04-19 NOTE — ED PROVIDER NOTE - OBJECTIVE STATEMENT
89 y/o male with a PMHx of peripheral artery disease, arthritis, HLD, HTN presents to the ED c/o dehydration. Per son, pt c/o no appetite x few weeks, can't swallow solid foods, pt usually eats a lot. Also reports losing weight, feeling weak. Received a call from PMD Dr. Hassan, blood test good, kidneys function 25%, probably dehydration issue and sent to ED. Per pt denies abd pain, nausea, vomiting, diarrhea. Only reports left over hip pain from prior broken hip.   Pulmon: Dr. Herrera. PMD: Dr. Hassan. 89 y/o male with a PMHx of peripheral artery disease, arthritis, HLD, HTN presents to the ED c/o dehydration. Per son, pt c/o no appetite x few weeks, can't swallow solid foods, pt usually eats a lot. Also reports losing weight, feeling weak. Received a call from PMD Dr. Hassan, creatine 2.2, probably dehydration issue and sent to ED. Per pt denies abd pain, nausea, vomiting, diarrhea. Only reports left over hip pain from prior broken hip.   Pulmon: Dr. Herrera. PMD: Dr. Hassan.

## 2019-04-19 NOTE — ED PROVIDER NOTE - PHYSICAL EXAMINATION
Gen: Alert, NAD, thin appearing.   Head/eyes: NC/AT, PERRL, EOMI, normal lids/conjunctiva, no scleral icterus  ENT: mm dry, airway patent.   Neck: supple, no tenderness/meningismus/JVD, Trachea midline  Pulm/lung: Bilateral clear BS, normal resp effort, no wheeze/stridor/retractions  CV/heart: RRR, no M/R/G, +2 dist pulses (radial, pedal DP/PT, popliteal)  GI/Abd: soft, NT/ND, +BS, no guarding/rebound tenderness  Musculoskeletal: no edema/erythema/cyanosis, FROM in all extremities, no C/T/L spine ttp  Skin: no rash, no vesicles, no petechaie, no ecchymosis, no swelling  Neuro: AAOx3, CN 2-12 intact, normal sensation, 5/5 motor strength in all extremities, normal gait, no dysmetria Gen: Alert, NAD, thin chronically ill appearing.   Head/eyes: NC/AT, PERRL, EOMI, normal lids/conjunctiva, no scleral icterus  ENT: mm dry, airway patent.   Neck: supple, no tenderness/meningismus/JVD, Trachea midline  Pulm/lung: Bilateral clear BS, normal resp effort, no wheeze/stridor/retractions  CV/heart: RRR, no M/R/G, +2 dist pulses (radial, pedal DP/PT, popliteal)  GI/Abd: soft, NT/ND, +BS, no guarding/rebound tenderness  Musculoskeletal: no edema/erythema/cyanosis, FROM in all extremities, no C/T/L spine ttp  Skin: no rash, no vesicles, no petechaie, no ecchymosis, no swelling  Neuro: AAOx3, CN 2-12 intact, normal sensation, 5/5 motor strength in all extremities, normal gait, no dysmetria

## 2019-04-19 NOTE — H&P ADULT - NEGATIVE NEUROLOGICAL SYMPTOMS
no syncope/no headache/no difficulty walking/no generalized seizures/no focal seizures/no loss of consciousness/no paresthesias/no transient paralysis/no tremors/no vertigo/no loss of sensation

## 2019-04-19 NOTE — H&P ADULT - NSICDXFAMILYHX_GEN_ALL_CORE_FT
FAMILY HISTORY:  Family history of colon cancer, twin brother with colon CA  Family history of MI (myocardial infarction), at age 72

## 2019-04-19 NOTE — ED ADULT TRIAGE NOTE - STATUS:
HPI  Maria Teresa is a 43 yo female who presents for sore throat x 2 days.  Fevers up to 100.3F.  Reports muscle aches in upper back and neck area.  Denies cough or nasal congestion.      ROS    See HPI  Physical Exam    Vitals & nursing notes reviewed. B/P: 113/72, T: 97.6, P: 91, R: Data Unavailable  Constitutional:  Alert, well nourished, well-developed, NAD  Head:  Atraumatic, normocephalic  Eyes:  Perrla, EOMI, conjunctiva:  Pink   Sclera:  Anicteric  Ears:  Canals clear BL, TM pearly BL  Throat:  (+) mild erythema, No exudates, or edema to postoropharynx  Neck:  Supple, no cervical LAD  Lungs:  CTA, no wheezes, rhonchi, or rales  CV:  RRR,  no murmur appreciated    ASSESSMENT  1. Throat pain  (primary encounter diagnosis)  2. Muscle aches  Comment: RST and influenza screen negative.  Strep culture pending.  Plan: Warm saline gargle BID-TID. Tylenol or advil for pain & fever PRN.  F/U with PCP in 3-5 days if sx persist or worsen.         Applied

## 2019-04-19 NOTE — ED ADULT NURSE NOTE - NSIMPLEMENTINTERV_GEN_ALL_ED
Implemented All Fall with Harm Risk Interventions:  [place] to call system. Call bell, personal items and telephone within reach. Instruct patient to call for assistance. Room bathroom lighting operational. Non-slip footwear when patient is off stretcher. Physically safe environment: no spills, clutter or unnecessary equipment. Stretcher in lowest position, wheels locked, appropriate side rails in place. Provide visual cue, wrist band, yellow gown, etc. Monitor gait and stability. Monitor for mental status changes and reorient to person, place, and time. Review medications for side effects contributing to fall risk. Reinforce activity limits and safety measures with patient and family. Provide visual clues: red socks.

## 2019-04-19 NOTE — ED PROVIDER NOTE - PROGRESS NOTE DETAILS
discussed case with Dr. Hassan, recommend to admit to hospitalist, BP ever since discharge from rehab has been 80's to 90's systolic discussed case with Dr. York will admit, requesting blood cultures and UA

## 2019-04-19 NOTE — ED ADULT NURSE NOTE - OBJECTIVE STATEMENT
89 yr. old male sent to ED by PMD for hypotension.  Pt. A & O X 3. C/o mild dizziness, Pt. brought in by aid via w/c, ambulatory to bed from w/c.  Generalized weakness.  Also c/o loss of appetite.  Admits to losing 20 lbs since February.  Thin in appearance. Reddened prickly rash noted on entire back.

## 2019-04-20 PROBLEM — I10 ESSENTIAL (PRIMARY) HYPERTENSION: Chronic | Status: ACTIVE | Noted: 2019-02-22

## 2019-04-20 PROBLEM — M19.90 UNSPECIFIED OSTEOARTHRITIS, UNSPECIFIED SITE: Chronic | Status: ACTIVE | Noted: 2019-02-22

## 2019-04-20 PROBLEM — I73.9 PERIPHERAL VASCULAR DISEASE, UNSPECIFIED: Chronic | Status: ACTIVE | Noted: 2019-02-22

## 2019-04-20 PROBLEM — E78.5 HYPERLIPIDEMIA, UNSPECIFIED: Chronic | Status: ACTIVE | Noted: 2019-02-22

## 2019-04-20 LAB
ANION GAP SERPL CALC-SCNC: 11 MMOL/L — SIGNIFICANT CHANGE UP (ref 5–17)
APPEARANCE UR: CLEAR — SIGNIFICANT CHANGE UP
BILIRUB UR-MCNC: NEGATIVE — SIGNIFICANT CHANGE UP
BUN SERPL-MCNC: 71 MG/DL — HIGH (ref 7–23)
CALCIUM SERPL-MCNC: 8.5 MG/DL — SIGNIFICANT CHANGE UP (ref 8.4–10.5)
CHLORIDE SERPL-SCNC: 104 MMOL/L — SIGNIFICANT CHANGE UP (ref 96–108)
CO2 SERPL-SCNC: 26 MMOL/L — SIGNIFICANT CHANGE UP (ref 22–31)
COLOR SPEC: SIGNIFICANT CHANGE UP
CREAT SERPL-MCNC: 1.09 MG/DL — SIGNIFICANT CHANGE UP (ref 0.5–1.3)
DIFF PNL FLD: NEGATIVE — SIGNIFICANT CHANGE UP
GLUCOSE SERPL-MCNC: 83 MG/DL — SIGNIFICANT CHANGE UP (ref 70–99)
GLUCOSE UR QL: NEGATIVE MG/DL — SIGNIFICANT CHANGE UP
HCT VFR BLD CALC: 33.5 % — LOW (ref 39–50)
HGB BLD-MCNC: 10.5 G/DL — LOW (ref 13–17)
KETONES UR-MCNC: ABNORMAL
LEUKOCYTE ESTERASE UR-ACNC: NEGATIVE — SIGNIFICANT CHANGE UP
MCHC RBC-ENTMCNC: 25.8 PG — LOW (ref 27–34)
MCHC RBC-ENTMCNC: 31.3 GM/DL — LOW (ref 32–36)
MCV RBC AUTO: 82.3 FL — SIGNIFICANT CHANGE UP (ref 80–100)
NITRITE UR-MCNC: NEGATIVE — SIGNIFICANT CHANGE UP
NRBC # BLD: 0 /100 WBCS — SIGNIFICANT CHANGE UP (ref 0–0)
PH UR: 8 — SIGNIFICANT CHANGE UP (ref 5–8)
PLATELET # BLD AUTO: 172 K/UL — SIGNIFICANT CHANGE UP (ref 150–400)
POTASSIUM SERPL-MCNC: 3.7 MMOL/L — SIGNIFICANT CHANGE UP (ref 3.5–5.3)
POTASSIUM SERPL-SCNC: 3.7 MMOL/L — SIGNIFICANT CHANGE UP (ref 3.5–5.3)
PROT UR-MCNC: NEGATIVE MG/DL — SIGNIFICANT CHANGE UP
RBC # BLD: 4.07 M/UL — LOW (ref 4.2–5.8)
RBC # FLD: 17.2 % — HIGH (ref 10.3–14.5)
SODIUM SERPL-SCNC: 141 MMOL/L — SIGNIFICANT CHANGE UP (ref 135–145)
SP GR SPEC: 1.01 — SIGNIFICANT CHANGE UP (ref 1.01–1.02)
UROBILINOGEN FLD QL: NEGATIVE MG/DL — SIGNIFICANT CHANGE UP
WBC # BLD: 8.74 K/UL — SIGNIFICANT CHANGE UP (ref 3.8–10.5)
WBC # FLD AUTO: 8.74 K/UL — SIGNIFICANT CHANGE UP (ref 3.8–10.5)

## 2019-04-20 PROCEDURE — 99233 SBSQ HOSP IP/OBS HIGH 50: CPT

## 2019-04-20 RX ADMIN — SODIUM CHLORIDE 60 MILLILITER(S): 9 INJECTION INTRAMUSCULAR; INTRAVENOUS; SUBCUTANEOUS at 17:59

## 2019-04-20 RX ADMIN — DORZOLAMIDE HYDROCHLORIDE TIMOLOL MALEATE 1 DROP(S): 20; 5 SOLUTION/ DROPS OPHTHALMIC at 06:00

## 2019-04-20 RX ADMIN — ENOXAPARIN SODIUM 30 MILLIGRAM(S): 100 INJECTION SUBCUTANEOUS at 11:40

## 2019-04-20 RX ADMIN — Medication 100 MILLIGRAM(S): at 06:00

## 2019-04-20 RX ADMIN — LATANOPROST 1 DROP(S): 0.05 SOLUTION/ DROPS OPHTHALMIC; TOPICAL at 21:25

## 2019-04-20 RX ADMIN — Medication 100 MILLIGRAM(S): at 21:25

## 2019-04-20 RX ADMIN — DORZOLAMIDE HYDROCHLORIDE TIMOLOL MALEATE 1 DROP(S): 20; 5 SOLUTION/ DROPS OPHTHALMIC at 17:49

## 2019-04-20 RX ADMIN — TAMSULOSIN HYDROCHLORIDE 0.4 MILLIGRAM(S): 0.4 CAPSULE ORAL at 21:25

## 2019-04-20 RX ADMIN — ATORVASTATIN CALCIUM 10 MILLIGRAM(S): 80 TABLET, FILM COATED ORAL at 21:25

## 2019-04-20 RX ADMIN — Medication 1 APPLICATION(S): at 17:52

## 2019-04-20 NOTE — DIETITIAN INITIAL EVALUATION ADULT. - OTHER INFO
Pt assessed as per policy: dx dehydration/decreased po intake PTA. Patient is 89 yo male with hx of pleural effusion, HTN, and HLD presenting with low BP, and ARF.  Patient noticed to have Low BP, and Renal function worsen, patient was instructed to come to the hospital for further evaluation, and treatment.  Patient reports generalized weakness, and poor appetite. Pt had volume overload/thoracentesis on recent admission so IVF were decreased. Pt reports that PTA he had poor appetite-some nausea. Noted weight loss of 9.0# since admit last month. Pt was 143.7# 3/6/19 and is now 129.8#.  Weight loss of 14% which is severe. As per Nutrition focused physical exam, pt found to have moderate-severe temporal and occipital wasting as well as loss of muscle to shoulder and triceps and protruding clavicle. Given the aforementioned, pt meets criteria for severe malnutrition in the context of acute illness. At this time pt is eating better (observed lunch)Recommend ensure enlive TID which pt is agreeable to.  Noted stage I p/u to sacrum. Pt assessed as per policy: dx dehydration/decreased po intake PTA. Patient is 89 yo male with hx of pleural effusion, HTN, and HLD presenting with low BP, and ARF.  Patient noticed to have Low BP, and Renal function worsen, patient was instructed to come to the hospital for further evaluation, and treatment.  Patient reports generalized weakness, and poor appetite. Pt had volume overload/thoracentesis on recent admission so IVF were decreased. Pt reports that PTA he had poor appetite-some nausea. Noted weight loss of 9.0# since admit last month. Pt was 143.7# 3/6/19 and is now 129.8#.  BMI 20.2 (underwt for age) Weight loss of 14% which is severe. As per Nutrition focused physical exam, pt found to have moderate-severe temporal and occipital wasting as well as loss of muscle to shoulder and triceps and protruding clavicle. Given the aforementioned, pt meets criteria for severe malnutrition in the context of acute illness. At this time pt is eating better (observed lunch)Recommend ensure enlive TID which pt is agreeable to.  Noted stage I p/u to sacrum.

## 2019-04-20 NOTE — PHYSICAL THERAPY INITIAL EVALUATION ADULT - ADDITIONAL COMMENTS
Pt lives in a senior home with 0 SALLIE, no steps inside home. Pt uses a rolling walker to ambulate inside and outside of house. Pt has an aide who comes to house to help 7 days a week, 6 hrs a day. As per CHUCK vargas, pt's plan is to go to rehab then move in with son after discharge.

## 2019-04-20 NOTE — PROGRESS NOTE ADULT - SUBJECTIVE AND OBJECTIVE BOX
Subjective: He had half his breakfast in the morning which is more than he usually has.     MEDICATIONS  (STANDING):  atorvastatin 10 milliGRAM(s) Oral at bedtime  docusate sodium 100 milliGRAM(s) Oral three times a day  dorzolamide 2%/timolol 0.5% Ophthalmic Solution 1 Drop(s) Both EYES every 12 hours  enoxaparin Injectable 30 milliGRAM(s) SubCutaneous daily  latanoprost 0.005% Ophthalmic Solution 1 Drop(s) Both EYES at bedtime  sodium chloride 0.9%. 1000 milliLiter(s) (60 mL/Hr) IV Continuous <Continuous>  tamsulosin 0.4 milliGRAM(s) Oral at bedtime    MEDICATIONS  (PRN):  acetaminophen   Tablet .. 650 milliGRAM(s) Oral every 6 hours PRN Temp greater or equal to 38C (100.4F), Mild Pain (1 - 3)  ALBUTerol/ipratropium for Nebulization 3 milliLiter(s) Nebulizer every 6 hours PRN Shortness of Breath and/or Wheezing  ondansetron Injectable 4 milliGRAM(s) IV Push every 6 hours PRN Nausea  senna 2 Tablet(s) Oral at bedtime PRN Constipation      Allergies    No Known Allergies    Intolerances        Vital Signs Last 24 Hrs  T(C): 36.4 (2019 10:10), Max: 37 (2019 17:09)  T(F): 97.6 (2019 10:10), Max: 98.6 (2019 17:09)  HR: 62 (2019 10:10) (62 - 96)  BP: 108/50 (2019 10:10) (79/35 - 108/50)  BP(mean): 47 (2019 21:11) (47 - 61)  RR: 18 (2019 10:10) (15 - 18)  SpO2: 96% (2019 10:10) (94% - 99%)    PHYSICAL EXAM:  GENERAL: NAD, well-groomed, well-developed  HEAD:  Atraumatic, Normocephalic  ENMT: Moist mucous membranes,   NECK: Supple, No JVD, Normal thyroid  NERVOUS SYSTEM:  All 4 extremities mobile, no gross sensory deficits.   CHEST/LUNG: Clear to auscultation bilaterally; No rales, rhonchi, wheezing, or rubs  HEART: Regular rate and rhythm; No murmurs, rubs, or gallops  ABDOMEN: Soft, Nontender, Nondistended; Bowel sounds present  EXTREMITIES:  2+ Peripheral Pulses, No clubbing, cyanosis, or edema      LABS:                        10.5   8.74  )-----------( 172      ( 2019 07:21 )             33.5     2019 07:20    141    |  104    |  71     ----------------------------<  83     3.7     |  26     |  1.09     Ca    8.5        2019 07:20    TPro  6.6    /  Alb  2.8    /  TBili  0.5    /  DBili  x      /  AST  26     /  ALT  21     /  AlkPhos  94     2019 17:53    PT/INR - ( 2019 17:53 )   PT: 11.9 sec;   INR: 1.09 ratio         PTT - ( 2019 17:53 )  PTT:32.8 sec  Urinalysis Basic - ( 2019 10:55 )    Color: x / Appearance: Clear / S.010 / pH: x  Gluc: x / Ketone: Trace  / Bili: Negative / Urobili: Negative mg/dL   Blood: x / Protein: Negative mg/dL / Nitrite: Negative   Leuk Esterase: Negative / RBC: x / WBC x   Sq Epi: x / Non Sq Epi: x / Bacteria: x      CAPILLARY BLOOD GLUCOSE          RADIOLOGY & ADDITIONAL TESTS:    Imaging Personally Reviewed:  [ ] YES     Consultant(s) Notes Reviewed:      Care Discussed with Consultants/Other Providers:    Advanced Directives: [ ] DNR  [ ] No feeding tube  [ ] MOLST in chart  [ ] MOLST completed today  [ ] Unknown

## 2019-04-20 NOTE — PROGRESS NOTE ADULT - ASSESSMENT
89M HTN, HLD, PAD admitted for Right IT Fracture and is post ORIF.    Generlized Weakness  Patient recently discharged from Rehab after having Right IT fracture ORIF back in February 2019. He has been living home alone for 3.5 weeks and over this time has lost his appetite and decreased PO intake.  He started feeling light headed and dizzy. Lisinopril was stopped and Torosemide was reduced from BID to QD by his PMD. Patient has recieved 1L of IVF. We will reduce his IVF intake at this point as due to Moderate AS patient did have volume overload on prior admission requiring thoracentesis and encourage PO intake. PT has been consulted. He has mild anemia and no signs of infection.    Right IT Fracture S/P ORIF February 2019  Related to mechanical Fall.  PT consulted.     HTN  Due to low BP hold all agents    Aortic Stenosis  Maintain hemodynamics and volume.  Will consult Cardiology     HLD   Atorvastatin    BPH  Flomax    Diet  Regular    DVT Prophylaxis  Lovenox     Disposition  Full Code/Inpatient  Discharge planning pending hospital course; I have advised patient that he needs more assistance at home and can not live at home alone.  I will reach out to his son in [place] to have this conversation as well.

## 2019-04-20 NOTE — CHART NOTE - NSCHARTNOTEFT_GEN_A_CORE
Upon Nutritional Assessment by the Registered Dietitian your patient was determined to meet criteria / has evidence of the following diagnosis/diagnoses:          [ ]  Mild Protein Calorie Malnutrition        [ ]  Moderate Protein Calorie Malnutrition        [ x] Severe Protein Calorie Malnutrition        [ ] Unspecified Protein Calorie Malnutrition        [ ] Underweight / BMI <19        [ ] Morbid Obesity / BMI > 40      Findings as based on:  •  Comprehensive nutrition assessment and consultation  •  Calorie counts (nutrient intake analysis)  •  Food acceptance and intake status from observations by staff  •  Follow up  •  Patient education  •  Intervention secondary to interdisciplinary rounds  •   concerns      Treatment:    The following diet has been recommended: DASH TLC ensure enlive TID      PROVIDER Section:     By signing this assessment you are acknowledging and agree with the diagnosis/diagnoses assigned by the Registered Dietitian    Comments:      Patient is 89 yo male with hx of pleural effusion, HTN, and HLD presenting with low BP, and ARF.  Patient noticed to have Low BP, and Renal function worsen, patient was instructed to come to the hospital for further evaluation, and treatment.  Patient reports generalized weakness, and poor appetite. Pt had volume overload/thoracentesis on recent admission so IVF were decreased. Pt reports that PTA he had poor appetite-some nausea. Noted weight loss of 9.0# since admit last month. Pt was 143.7# 3/6/19 and is now 129.8#.  Weight loss of 14% which is severe. As per Nutrition focused physical exam, pt found to have moderate-severe temporal and occipital wasting as well as loss of muscle to shoulder and triceps and protruding clavicle. Given the aforementioned, pt meets criteria for severe malnutrition in the context of acute illness. At this time pt is eating better (observed lunch)Recommend ensure enlive TID which pt is agreeable to.  Noted stage I p/u to sacrum.

## 2019-04-21 LAB
ANION GAP SERPL CALC-SCNC: 8 MMOL/L — SIGNIFICANT CHANGE UP (ref 5–17)
BUN SERPL-MCNC: 35 MG/DL — HIGH (ref 7–23)
CALCIUM SERPL-MCNC: 8.4 MG/DL — SIGNIFICANT CHANGE UP (ref 8.4–10.5)
CHLORIDE SERPL-SCNC: 106 MMOL/L — SIGNIFICANT CHANGE UP (ref 96–108)
CO2 SERPL-SCNC: 27 MMOL/L — SIGNIFICANT CHANGE UP (ref 22–31)
CREAT SERPL-MCNC: 0.78 MG/DL — SIGNIFICANT CHANGE UP (ref 0.5–1.3)
GLUCOSE SERPL-MCNC: 101 MG/DL — HIGH (ref 70–99)
POTASSIUM SERPL-MCNC: 3.7 MMOL/L — SIGNIFICANT CHANGE UP (ref 3.5–5.3)
POTASSIUM SERPL-SCNC: 3.7 MMOL/L — SIGNIFICANT CHANGE UP (ref 3.5–5.3)
SODIUM SERPL-SCNC: 141 MMOL/L — SIGNIFICANT CHANGE UP (ref 135–145)

## 2019-04-21 PROCEDURE — 99233 SBSQ HOSP IP/OBS HIGH 50: CPT

## 2019-04-21 RX ADMIN — DORZOLAMIDE HYDROCHLORIDE TIMOLOL MALEATE 1 DROP(S): 20; 5 SOLUTION/ DROPS OPHTHALMIC at 18:29

## 2019-04-21 RX ADMIN — Medication 100 MILLIGRAM(S): at 12:54

## 2019-04-21 RX ADMIN — ENOXAPARIN SODIUM 30 MILLIGRAM(S): 100 INJECTION SUBCUTANEOUS at 11:31

## 2019-04-21 RX ADMIN — DORZOLAMIDE HYDROCHLORIDE TIMOLOL MALEATE 1 DROP(S): 20; 5 SOLUTION/ DROPS OPHTHALMIC at 06:03

## 2019-04-21 RX ADMIN — ATORVASTATIN CALCIUM 10 MILLIGRAM(S): 80 TABLET, FILM COATED ORAL at 21:58

## 2019-04-21 RX ADMIN — Medication 1 APPLICATION(S): at 18:29

## 2019-04-21 RX ADMIN — LATANOPROST 1 DROP(S): 0.05 SOLUTION/ DROPS OPHTHALMIC; TOPICAL at 21:58

## 2019-04-21 RX ADMIN — TAMSULOSIN HYDROCHLORIDE 0.4 MILLIGRAM(S): 0.4 CAPSULE ORAL at 21:58

## 2019-04-21 RX ADMIN — Medication 100 MILLIGRAM(S): at 21:58

## 2019-04-21 RX ADMIN — Medication 100 MILLIGRAM(S): at 05:51

## 2019-04-21 RX ADMIN — Medication 1 APPLICATION(S): at 05:51

## 2019-04-21 NOTE — PROGRESS NOTE ADULT - ASSESSMENT
89M HTN, HLD, PAD admitted for Right IT Fracture and is post ORIF.    Generlized Weakness  Improved. BP has also improved. Continue to encourage oral intake.     Patient recently discharged from Rehab after having Right IT fracture ORIF back in February 2019. He has been living home alone for 3.5 weeks and over this time has lost his appetite and decreased PO intake.  He started feeling light headed and dizzy. Lisinopril was stopped and Torosemide was reduced from BID to QD by his PMD. Patient has recieved 1L of IVF. We will reduce his IVF intake at this point as due to Moderate AS patient did have volume overload on prior admission requiring thoracentesis and encourage PO intake. PT has been consulted. He has mild anemia and no signs of infection.    Right IT Fracture S/P ORIF February 2019  Related to mechanical Fall.  PT consulted.     HTN  BP improving with IV hydration and oral intake.     Aortic Stenosis  Maintain hemodynamics and volume.  Will resume Toresemide tomorrow at reduced dose.      HLD   Atorvastatin    BPH  Flomax    Diet  Regular    DVT Prophylaxis  Lovenox     Disposition  Full Code/Inpatient  Discharge planning will be to short term rehab; I have advised patient that he needs more assistance at home and can not live at home alone.  He has plans to move in with his son who lives in [place] is working out the details.

## 2019-04-21 NOTE — PROGRESS NOTE ADULT - SUBJECTIVE AND OBJECTIVE BOX
Subjective: Eating lunch and able to ambulate to bathroom and back.  Appears more energetic and feeling better.     MEDICATIONS  (STANDING):  atorvastatin 10 milliGRAM(s) Oral at bedtime  betamethasone valerate 0.1% Cream 1 Application(s) Topical two times a day  docusate sodium 100 milliGRAM(s) Oral three times a day  dorzolamide 2%/timolol 0.5% Ophthalmic Solution 1 Drop(s) Both EYES every 12 hours  enoxaparin Injectable 30 milliGRAM(s) SubCutaneous daily  latanoprost 0.005% Ophthalmic Solution 1 Drop(s) Both EYES at bedtime  tamsulosin 0.4 milliGRAM(s) Oral at bedtime    MEDICATIONS  (PRN):  acetaminophen   Tablet .. 650 milliGRAM(s) Oral every 6 hours PRN Temp greater or equal to 38C (100.4F), Mild Pain (1 - 3)  ALBUTerol/ipratropium for Nebulization 3 milliLiter(s) Nebulizer every 6 hours PRN Shortness of Breath and/or Wheezing  ondansetron Injectable 4 milliGRAM(s) IV Push every 6 hours PRN Nausea  senna 2 Tablet(s) Oral at bedtime PRN Constipation      Allergies    No Known Allergies    Intolerances        Vital Signs Last 24 Hrs  T(C): 36.6 (2019 10:42), Max: 36.7 (2019 16:33)  T(F): 97.9 (2019 10:42), Max: 98 (2019 16:33)  HR: 73 (2019 10:42) (73 - 758)  BP: 127/75 (2019 10:42) (101/57 - 127/75)  BP(mean): 76 (2019 00:00) (76 - 76)  RR: 17 (2019 10:42) (17 - 18)  SpO2: 96% (2019 10:42) (96% - 99%)    PHYSICAL EXAM:  GENERAL: NAD, well-groomed, well-developed  HEAD:  Atraumatic, Normocephalic  ENMT: Moist mucous membranes,   NECK: Supple, No JVD, Normal thyroid  NERVOUS SYSTEM:  All 4 extremities mobile, no gross sensory deficits.   CHEST/LUNG: Clear to auscultation bilaterally; No rales, rhonchi, wheezing, or rubs  HEART: Regular rate and rhythm; No murmurs, rubs, or gallops  ABDOMEN: Soft, Nontender, Nondistended; Bowel sounds present  EXTREMITIES:  2+ Peripheral Pulses, No clubbing, cyanosis, or edema      LABS:    2019 07:31    141    |  106    |  35     ----------------------------<  101    3.7     |  27     |  0.78     Ca    8.4        2019 07:31      PT/INR - ( 2019 17:53 )   PT: 11.9 sec;   INR: 1.09 ratio         PTT - ( 2019 17:53 )  PTT:32.8 sec  Urinalysis Basic - ( 2019 10:55 )    Color: Pale Yellow / Appearance: Clear / S.010 / pH: x  Gluc: x / Ketone: Trace  / Bili: Negative / Urobili: Negative mg/dL   Blood: x / Protein: Negative mg/dL / Nitrite: Negative   Leuk Esterase: Negative / RBC: x / WBC x   Sq Epi: x / Non Sq Epi: x / Bacteria: x      CAPILLARY BLOOD GLUCOSE          RADIOLOGY & ADDITIONAL TESTS:    Imaging Personally Reviewed:  [ ] YES     Consultant(s) Notes Reviewed:      Care Discussed with Consultants/Other Providers:    Advanced Directives: [ ] DNR  [ ] No feeding tube  [ ] MOLST in chart  [ ] MOLST completed today  [ ] Unknown

## 2019-04-22 PROCEDURE — 99232 SBSQ HOSP IP/OBS MODERATE 35: CPT

## 2019-04-22 RX ADMIN — Medication 1 APPLICATION(S): at 06:15

## 2019-04-22 RX ADMIN — Medication 100 MILLIGRAM(S): at 06:15

## 2019-04-22 RX ADMIN — LATANOPROST 1 DROP(S): 0.05 SOLUTION/ DROPS OPHTHALMIC; TOPICAL at 22:26

## 2019-04-22 RX ADMIN — ATORVASTATIN CALCIUM 10 MILLIGRAM(S): 80 TABLET, FILM COATED ORAL at 22:26

## 2019-04-22 RX ADMIN — ENOXAPARIN SODIUM 30 MILLIGRAM(S): 100 INJECTION SUBCUTANEOUS at 11:20

## 2019-04-22 RX ADMIN — Medication 100 MILLIGRAM(S): at 22:26

## 2019-04-22 RX ADMIN — DORZOLAMIDE HYDROCHLORIDE TIMOLOL MALEATE 1 DROP(S): 20; 5 SOLUTION/ DROPS OPHTHALMIC at 06:15

## 2019-04-22 RX ADMIN — Medication 1 APPLICATION(S): at 17:18

## 2019-04-22 RX ADMIN — Medication 100 MILLIGRAM(S): at 13:58

## 2019-04-22 RX ADMIN — DORZOLAMIDE HYDROCHLORIDE TIMOLOL MALEATE 1 DROP(S): 20; 5 SOLUTION/ DROPS OPHTHALMIC at 17:19

## 2019-04-22 RX ADMIN — TAMSULOSIN HYDROCHLORIDE 0.4 MILLIGRAM(S): 0.4 CAPSULE ORAL at 22:26

## 2019-04-22 NOTE — PROGRESS NOTE ADULT - ASSESSMENT
89M HTN, HLD, PAD admitted for Right IT Fracture and is post ORIF.    Generlized Weakness  Improved. BP has also improved. Continue to encourage oral intake.     Patient recently discharged from Rehab after having Right IT fracture ORIF back in February 2019. He has been living home alone for 3.5 weeks and over this time has lost his appetite and decreased PO intake.  He started feeling light headed and dizzy. Lisinopril was stopped and Torosemide was reduced from BID to QD by his PMD. Patient has recieved 1L of IVF. We will reduce his IVF intake at this point as due to Moderate AS patient did have volume overload on prior admission requiring thoracentesis and encourage PO intake. PT has been consulted. He has mild anemia and no signs of infection.    Right IT Fracture S/P ORIF February 2019  Related to mechanical Fall.  PT consulted and recommends short term rehab.     HTN  BP improving with IV hydration and oral intake.     Aortic Stenosis  Maintain hemodynamics and volume.  Will resume Toresemide tomorrow at reduced dose.      HLD   Atorvastatin    BPH  Flomax    Diet  Regular    DVT Prophylaxis  Lovenox     Disposition  Full Code/Inpatient  Discharge planning will be to short term rehab; I have advised patient that he needs more assistance at home and can not live at home alone.  He has plans to move in with his son who lives in [place] is working out the details. Awaiting placement.

## 2019-04-22 NOTE — PROGRESS NOTE ADULT - SUBJECTIVE AND OBJECTIVE BOX
Subjective: No overnight event. Feeling better and more energetic.  Awaiting placement.     MEDICATIONS  (STANDING):  atorvastatin 10 milliGRAM(s) Oral at bedtime  betamethasone valerate 0.1% Cream 1 Application(s) Topical two times a day  docusate sodium 100 milliGRAM(s) Oral three times a day  dorzolamide 2%/timolol 0.5% Ophthalmic Solution 1 Drop(s) Both EYES every 12 hours  enoxaparin Injectable 30 milliGRAM(s) SubCutaneous daily  latanoprost 0.005% Ophthalmic Solution 1 Drop(s) Both EYES at bedtime  tamsulosin 0.4 milliGRAM(s) Oral at bedtime    MEDICATIONS  (PRN):  acetaminophen   Tablet .. 650 milliGRAM(s) Oral every 6 hours PRN Temp greater or equal to 38C (100.4F), Mild Pain (1 - 3)  ALBUTerol/ipratropium for Nebulization 3 milliLiter(s) Nebulizer every 6 hours PRN Shortness of Breath and/or Wheezing  ondansetron Injectable 4 milliGRAM(s) IV Push every 6 hours PRN Nausea  senna 2 Tablet(s) Oral at bedtime PRN Constipation      Allergies    No Known Allergies    Intolerances        Vital Signs Last 24 Hrs  T(C): 37.3 (2019 06:02), Max: 37.3 (2019 06:02)  T(F): 99.1 (2019 06:02), Max: 99.1 (2019 06:02)  HR: 82 (2019 06:02) (73 - 89)  BP: 121/64 (2019 06:02) (119/62 - 145/63)  BP(mean): --  RR: 17 (2019 06:02) (16 - 18)  SpO2: 95% (2019 06:02) (95% - 98%)    PHYSICAL EXAM:  GENERAL: NAD, well-groomed, well-developed  HEAD:  Atraumatic, Normocephalic  ENMT: Moist mucous membranes,   NECK: Supple, No JVD, Normal thyroid  NERVOUS SYSTEM:  All 4 extremities mobile, no gross sensory deficits.   CHEST/LUNG: Clear to auscultation bilaterally; No rales, rhonchi, wheezing, or rubs  HEART: Regular rate and rhythm; No murmurs, rubs, or gallops  ABDOMEN: Soft, Nontender, Nondistended; Bowel sounds present  EXTREMITIES:  2+ Peripheral Pulses, No clubbing, cyanosis, or edema      LABS:      Ca    8.4        2019 07:31        Urinalysis Basic - ( 2019 10:55 )    Color: Pale Yellow / Appearance: Clear / S.010 / pH: x  Gluc: x / Ketone: Trace  / Bili: Negative / Urobili: Negative mg/dL   Blood: x / Protein: Negative mg/dL / Nitrite: Negative   Leuk Esterase: Negative / RBC: x / WBC x   Sq Epi: x / Non Sq Epi: x / Bacteria: x      CAPILLARY BLOOD GLUCOSE          RADIOLOGY & ADDITIONAL TESTS:    Imaging Personally Reviewed:  [ ] YES     Consultant(s) Notes Reviewed:      Care Discussed with Consultants/Other Providers:    Advanced Directives: [ ] DNR  [ ] No feeding tube  [ ] MOLST in chart  [ ] MOLST completed today  [ ] Unknown

## 2019-04-23 ENCOUNTER — TRANSCRIPTION ENCOUNTER (OUTPATIENT)
Age: 84
End: 2019-04-23

## 2019-04-23 VITALS
TEMPERATURE: 98 F | RESPIRATION RATE: 18 BRPM | HEART RATE: 98 BPM | SYSTOLIC BLOOD PRESSURE: 130 MMHG | OXYGEN SATURATION: 98 % | DIASTOLIC BLOOD PRESSURE: 65 MMHG

## 2019-04-23 LAB
ANION GAP SERPL CALC-SCNC: 5 MMOL/L — SIGNIFICANT CHANGE UP (ref 5–17)
BUN SERPL-MCNC: 20 MG/DL — SIGNIFICANT CHANGE UP (ref 7–23)
CALCIUM SERPL-MCNC: 8.6 MG/DL — SIGNIFICANT CHANGE UP (ref 8.4–10.5)
CHLORIDE SERPL-SCNC: 108 MMOL/L — SIGNIFICANT CHANGE UP (ref 96–108)
CO2 SERPL-SCNC: 28 MMOL/L — SIGNIFICANT CHANGE UP (ref 22–31)
CREAT SERPL-MCNC: 0.67 MG/DL — SIGNIFICANT CHANGE UP (ref 0.5–1.3)
GLUCOSE SERPL-MCNC: 96 MG/DL — SIGNIFICANT CHANGE UP (ref 70–99)
POTASSIUM SERPL-MCNC: 4 MMOL/L — SIGNIFICANT CHANGE UP (ref 3.5–5.3)
POTASSIUM SERPL-SCNC: 4 MMOL/L — SIGNIFICANT CHANGE UP (ref 3.5–5.3)
SODIUM SERPL-SCNC: 141 MMOL/L — SIGNIFICANT CHANGE UP (ref 135–145)

## 2019-04-23 PROCEDURE — 87040 BLOOD CULTURE FOR BACTERIA: CPT

## 2019-04-23 PROCEDURE — 97110 THERAPEUTIC EXERCISES: CPT

## 2019-04-23 PROCEDURE — 81003 URINALYSIS AUTO W/O SCOPE: CPT

## 2019-04-23 PROCEDURE — 80053 COMPREHEN METABOLIC PANEL: CPT

## 2019-04-23 PROCEDURE — 80048 BASIC METABOLIC PNL TOTAL CA: CPT

## 2019-04-23 PROCEDURE — 99285 EMERGENCY DEPT VISIT HI MDM: CPT

## 2019-04-23 PROCEDURE — 85027 COMPLETE CBC AUTOMATED: CPT

## 2019-04-23 PROCEDURE — 85610 PROTHROMBIN TIME: CPT

## 2019-04-23 PROCEDURE — 97161 PT EVAL LOW COMPLEX 20 MIN: CPT

## 2019-04-23 PROCEDURE — 71045 X-RAY EXAM CHEST 1 VIEW: CPT

## 2019-04-23 PROCEDURE — 99239 HOSP IP/OBS DSCHRG MGMT >30: CPT

## 2019-04-23 PROCEDURE — 76770 US EXAM ABDO BACK WALL COMP: CPT

## 2019-04-23 PROCEDURE — 85730 THROMBOPLASTIN TIME PARTIAL: CPT

## 2019-04-23 PROCEDURE — 97116 GAIT TRAINING THERAPY: CPT

## 2019-04-23 PROCEDURE — 93005 ELECTROCARDIOGRAM TRACING: CPT

## 2019-04-23 PROCEDURE — 36415 COLL VENOUS BLD VENIPUNCTURE: CPT

## 2019-04-23 RX ORDER — LISINOPRIL 2.5 MG/1
1 TABLET ORAL
Qty: 0 | Refills: 0 | COMMUNITY

## 2019-04-23 RX ORDER — LISINOPRIL 2.5 MG/1
1 TABLET ORAL
Qty: 0 | Refills: 0 | COMMUNITY
Start: 2019-04-23

## 2019-04-23 RX ADMIN — DORZOLAMIDE HYDROCHLORIDE TIMOLOL MALEATE 1 DROP(S): 20; 5 SOLUTION/ DROPS OPHTHALMIC at 05:56

## 2019-04-23 RX ADMIN — Medication 100 MILLIGRAM(S): at 05:56

## 2019-04-23 RX ADMIN — Medication 20 MILLIGRAM(S): at 05:56

## 2019-04-23 RX ADMIN — Medication 1 APPLICATION(S): at 05:56

## 2019-04-23 NOTE — DISCHARGE NOTE PROVIDER - NSDCCPCAREPLAN_GEN_ALL_CORE_FT
PRINCIPAL DISCHARGE DIAGNOSIS  Diagnosis: Dehydration  Assessment and Plan of Treatment:       SECONDARY DISCHARGE DIAGNOSES  Diagnosis: Acute kidney injury  Assessment and Plan of Treatment: Resolved    Diagnosis: Failure to thrive in adult  Assessment and Plan of Treatment:

## 2019-04-23 NOTE — DISCHARGE NOTE PROVIDER - HOSPITAL COURSE
Patient is 89 yo male with hx of pleural effusion, HTN, and HLD presenting with low BP, and ARF.  Patient noticed to have Low BP, and Renal function worsen, patient was instructed to come to the hospital for further evaluation, and treatment.  Patient reports generalized weakness, and poor appetite at home and in the ER was found to by hypotensive and mild Acute Renal Failure.  The patient was admitted for IV hydration his diuretics were held for few days and patient was placed on a regular diet which he ate about 90% of his trays.  Further history taking revealed that the patient was not getting adequately prepared meals at home by the aides he had. Patient did not have any trouble or difficulty eating his meals while he was admitted. Over the next several days the patient improved significantly and PT was asked to evaluate patient and recommeded for short term rehab.  I had a conversation with both of his sons and explained that his living situation will need to be improved or else he will return.  They are in works to relocate the patient to [place] with his other son sometime in the near future.  I have reduced his diuretic to once a day and his lisinopril was taken down from 40mg to 5 mg daily and can be titrated up at rehab based on BP. Discharge planning and coordination took 40 minutes.                 PHYSICAL EXAM:    Vital Signs Last 24 Hrs    T(C): 36.7 (23 Apr 2019 08:06), Max: 36.7 (23 Apr 2019 00:00)    T(F): 98.1 (23 Apr 2019 08:06), Max: 98.1 (23 Apr 2019 00:00)    HR: 81 (23 Apr 2019 08:06) (81 - 92)    BP: 121/69 (23 Apr 2019 08:06) (118/61 - 130/67)    BP(mean): --    RR: 18 (23 Apr 2019 08:06) (16 - 18)    SpO2: 94% (23 Apr 2019 08:06) (94% - 98%)        GENERAL: NAD, well-groomed, well-developed    HEAD:  Atraumatic, Normocephalic    EYES: EOMI, PERRLA, conjunctiva and sclera clear    ENMT: No tonsillar erythema, exudates, or enlargement; Moist mucous membranes, Good dentition, No lesions    NECK: Supple, No JVD, Normal thyroid    NERVOUS SYSTEM:  Alert & Oriented X3, Good concentration;     Motor Strength 5/5 B/L upper and lower extremities;     CHEST/LUNG: Clear to auscultation bilaterally; No rales, rhonchi, wheezing, or rubs    HEART: Regular rate and rhythm; No murmurs, rubs, or gallops    ABDOMEN: Soft, Nontender, Nondistended; Bowel sounds present    EXTREMITIES:  2+ Peripheral Pulses, No clubbing, cyanosis, or edema    LYMPH: No lymphadenopathy noted

## 2019-04-23 NOTE — DISCHARGE NOTE PROVIDER - NSDCFUADDINST_GEN_ALL_CORE_FT
Please monitor your BP and will need to titrate your HTN medications as your BP improves. Please make an appointment to follow up with your cardiologist and your PCP.

## 2019-04-23 NOTE — DISCHARGE NOTE NURSING/CASE MANAGEMENT/SOCIAL WORK - NSDCDPATPORTLINK_GEN_ALL_CORE
You can access the [facility] Patient Portal, offered by [facility], by registering with the following website: [email or web address]

## 2019-04-23 NOTE — DISCHARGE NOTE PROVIDER - CARE PROVIDERS DIRECT ADDRESSES
[email or web address],[email or web address]

## 2019-04-23 NOTE — DISCHARGE NOTE PROVIDER - CARE PROVIDER_API CALL
Palla, Venugopal R (MD)  Cardiovascular Disease; Internal Medicine  [address], [place] [id number]  Phone: [phone]  Fax: [phone]  Follow Up Time:     Vishnu Hassan (DO)  Internal Medicine  [address] [facility], [facility] [address]  [place] [address]  Phone: [phone]  Fax: [phone]  Follow Up Time:

## 2019-04-24 LAB
CULTURE RESULTS: SIGNIFICANT CHANGE UP
SPECIMEN SOURCE: SIGNIFICANT CHANGE UP

## 2019-04-25 LAB
CULTURE RESULTS: SIGNIFICANT CHANGE UP
CULTURE RESULTS: SIGNIFICANT CHANGE UP
SPECIMEN SOURCE: SIGNIFICANT CHANGE UP
SPECIMEN SOURCE: SIGNIFICANT CHANGE UP

## 2019-04-27 LAB
CULTURE RESULTS: SIGNIFICANT CHANGE UP
SPECIMEN SOURCE: SIGNIFICANT CHANGE UP

## 2020-03-25 NOTE — PROGRESS NOTE ADULT - SUBJECTIVE AND OBJECTIVE BOX
CC.  Right hip pain  HPI.  Patient reports right hip pain is controlled.  Cough, and SOB are improving.  Offers no other complaints                Constitutional: No fever, fatigue or weight loss.  Skin: No rash.  Eyes: No recent vision problems or eye pain.  ENT: No congestion, ear pain, or sore throat.  Endocrine: No thyroid problems.  Cardiovascular: No chest pain or palpation.  Respiratory: No  wheezing.  Gastrointestinal: No abdominal pain, nausea, vomiting, or diarrhea.  Genitourinary: No dysuria.  Musculoskeletal: No joint swelling.  Neurologic: No headache.      Vital Signs Last 24 Hrs  T(C): 36.6 (04 Mar 2019 07:00), Max: 36.7 (03 Mar 2019 15:00)  T(F): 97.8 (04 Mar 2019 07:00), Max: 98 (03 Mar 2019 15:00)  HR: 92 (04 Mar 2019 10:49) (78 - 92)  BP: 118/67 (04 Mar 2019 07:00) (118/67 - 139/67)  BP(mean): --  RR: 17 (04 Mar 2019 07:00) (16 - 17)  SpO2: 97% (04 Mar 2019 10:49) (92% - 99%)        PHYSICAL EXAM-  GENERAL: Chronic ill appearing male   HEAD:  Atraumatic, Normocephalic  EYES: EOMI, PERRLA, conjunctiva and sclera clear  NECK: Supple, No JVD, Normal thyroid  NERVOUS SYSTEM:  Alert & Oriented moving all extremites  CHEST/LUNG: Min Rhonchi sound with good air entry.  No retractions or accessory muscle usage  HEART: Regular rate and rhythm; No murmurs, rubs, or gallops  ABDOMEN: Soft, Nontender, Nondistended; Bowel sounds present  EXTREMITIES:  No clubbing, cyanosis, or edema  SKIN: No rashes or lesions                            10.8   13.78 )-----------( 315      ( 04 Mar 2019 07:13 )             35.4     03-04    140  |  103  |  16  ----------------------------<  103<H>  3.7   |  33<H>  |  0.75    Ca    7.9<L>      04 Mar 2019 07:13    MEDICATIONS  (STANDING):  ALBUTerol    0.083% 2.5 milliGRAM(s) Nebulizer every 8 hours  atorvastatin 10 milliGRAM(s) Oral at bedtime  dorzolamide 2%/timolol 0.5% Ophthalmic Solution 1 Drop(s) Both EYES every 12 hours  enoxaparin Injectable 40 milliGRAM(s) SubCutaneous daily  furosemide    Tablet 20 milliGRAM(s) Oral daily  hydrochlorothiazide 25 milliGRAM(s) Oral daily  latanoprost 0.005% Ophthalmic Solution 1 Drop(s) Both EYES at bedtime  lisinopril 10 milliGRAM(s) Oral daily  pentoxifylline 400 milliGRAM(s) Oral three times a day  piperacillin/tazobactam IVPB. 3.375 Gram(s) IV Intermittent every 8 hours  tamsulosin 0.4 milliGRAM(s) Oral at bedtime    MEDICATIONS  (PRN):  ALBUTerol/ipratropium for Nebulization 3 milliLiter(s) Nebulizer every 6 hours PRN Shortness of Breath          Imaging Personally Reviewed:     [x ] YES  [ ] NO    Consultant(s) Notes Reviewed:  [x ] YES  [ ] NO    Care Discussed with Consultants/Other Providers [x ] YES  [ ] No medical contraindication for discharge 1 pair

## 2021-03-27 NOTE — PROGRESS NOTE ADULT - PROBLEM SELECTOR PLAN 3
Pt gives permission to speak to and provide information to individuals noted on HIPPA form.     Saw Diaz  [address] [place] [address]      See HIPPA Form Repeat CT or CXR. Clinically the patient does not appear to be in heart failure. Continue furosemide. Possibly related to infectious or oncological process.

## 2022-04-05 NOTE — PHYSICAL THERAPY INITIAL EVALUATION ADULT - NAME OF DISCHARGE PLANNER
CHUCK Cullen Normal vision: sees adequately in most situations; can see medication labels, newsprint

## 2023-01-24 NOTE — ED ADULT NURSE NOTE - PAIN: DESCRIPTION (FREQUENCY/QUALITY)
deep/intermittent Drysol Counseling:  I discussed with the patient the risks of drysol/aluminum chloride including but not limited to skin rash, itching, irritation, burning.

## 2023-04-06 NOTE — STUDENT SIGN OFF DOCUMENT - COPY OF STUDENT DOCUMENT REVIEW
Insurance is requiring weight and BMI recorded in the last 30 days. Please advise. Thank you.  
Food and Nutrition - Dietetic Intern
food and nutrition

## 2023-10-20 NOTE — PHYSICAL THERAPY INITIAL EVALUATION ADULT - PATIENT PROFILE REVIEW, REHAB EVAL
yes Adbry Pregnancy And Lactation Text: It is unknown if this medication will adversely affect pregnancy or breast feeding.

## 2023-11-13 NOTE — PROGRESS NOTE ADULT - SUBJECTIVE AND OBJECTIVE BOX
Subjective: Doing well and feeling better with his breathing. Patient O2 requirements have come down to 1L Nasal Canula.     MEDICATIONS  (STANDING):  atorvastatin 10 milliGRAM(s) Oral at bedtime  dorzolamide 2%/timolol 0.5% Ophthalmic Solution 1 Drop(s) Both EYES every 12 hours  enoxaparin Injectable 40 milliGRAM(s) SubCutaneous daily  hydrochlorothiazide 25 milliGRAM(s) Oral daily  latanoprost 0.005% Ophthalmic Solution 1 Drop(s) Both EYES at bedtime  lisinopril 10 milliGRAM(s) Oral daily  pentoxifylline 400 milliGRAM(s) Oral three times a day  piperacillin/tazobactam IVPB. 3.375 Gram(s) IV Intermittent every 8 hours  tamsulosin 0.4 milliGRAM(s) Oral at bedtime    MEDICATIONS  (PRN):  ALBUTerol/ipratropium for Nebulization 3 milliLiter(s) Nebulizer every 6 hours PRN Shortness of Breath  morphine  - Injectable 4 milliGRAM(s) IV Push every 4 hours PRN Moderate Pain (4 - 6)  oxyCODONE    IR 5 milliGRAM(s) Oral every 3 hours PRN Mild Pain (1 - 3)  oxyCODONE    IR 10 milliGRAM(s) Oral every 3 hours PRN Moderate Pain (4 - 6)      Allergies    No Known Allergies    Intolerances      Vital Signs Last 24 Hrs  T(C): 36.2 (01 Mar 2019 07:18), Max: 37.1 (28 Feb 2019 15:00)  T(F): 97.2 (01 Mar 2019 07:18), Max: 98.7 (28 Feb 2019 15:00)  HR: 83 (01 Mar 2019 07:18) (81 - 105)  BP: 144/68 (01 Mar 2019 07:18) (144/68 - 152/61)  BP(mean): --  RR: 20 (01 Mar 2019 07:18) (17 - 20)  SpO2: 95% (01 Mar 2019 07:18) (91% - 98%)    PHYSICAL EXAM:  GENERAL: NAD, well-groomed, well-developed  HEAD:  Atraumatic, Normocephalic  EYES: EOMI, PERRLA, conjunctiva and sclera clear  ENMT: Moist mucous membranes, Good dentition, No lesions; No tonsillar erythema, exudates, or enlargement  NECK: Supple, No JVD, Normal thyroid  NERVOUS SYSTEM:  Alert & Oriented X3, Good concentration;   CHEST/LUNG: Clear to auscultation bilaterally; No rales, rhonchi, wheezing, or rubs  HEART: Regular rate and rhythm; Systolic Murmer Grade 2;  rubs, or gallops  ABDOMEN: Soft, Nontender, Nondistended; Bowel sounds present  EXTREMITIES:  2+ Peripheral Pulses, No clubbing, cyanosis, or edema  LYMPH: No lymphadenopathy noted  SKIN: No rashes or lesions    LABS:                        10.3   11.39 )-----------( 262      ( 01 Mar 2019 08:03 )             33.2     01 Mar 2019 08:03    137    |  99     |  20     ----------------------------<  97     3.5     |  32     |  0.68     Ca    7.7        01 Mar 2019 08:03          CAPILLARY BLOOD GLUCOSE          RADIOLOGY & ADDITIONAL TESTS:    Imaging Personally Reviewed:  [ ] YES     Consultant(s) Notes Reviewed:      Care Discussed with Consultants/Other Providers:    Advanced Directives: [ ] DNR  [ ] No feeding tube  [ ] MOLST in chart  [ ] MOLST completed today  [ ] Unknown 22.1

## 2024-05-03 NOTE — ED ADULT NURSE NOTE - NSFALLRSKOUTCOME_ED_ALL_ED
Pharmacist Admission Medication History    Admission medication history is complete. The information provided in this note is only as accurate as the sources available at the time of the update.    Information Source(s): Patient and CareEverywhere/SureScripts via in-person    Pertinent Information:     Changes made to PTA medication list:  Added: None  Deleted: ibuprofen, omeprazole  Changed: None    Allergies reviewed with patient and updates made in EHR: yes    Medication History Completed By: Violet Stokes [facility] 5/3/2024 3:47 PM    PTA Med List   Medication Sig Last Dose    amoxicillin (AMOXIL) 500 MG capsule Before Dental Appts Unknown    aspirin (ASA) 81 MG EC tablet Take 1 tablet (81 mg) by mouth daily 5/3/2024    benzonatate (TESSALON) 200 MG capsule Take 1 capsule (200 mg) by mouth 3 times daily as needed for cough     CIMETIDINE ACID REDUCER PO Take 1 tablet by mouth daily as needed  Unknown    diazepam (VALIUM) 5 MG tablet TAKE 1/2 TABLET BY MOUTH AT BEDTIME AS NEEDED FOR SEVERE MUSCLE SPASMS Unknown    esomeprazole (NEXIUM) 40 MG DR capsule Take 40 mg by mouth 2 times daily 5/3/2024 at am    fish oil-omega-3 fatty acids 1000 MG capsule Take 2 g by mouth daily 5/3/2024    hydrochlorothiazide (HYDRODIURIL) 50 MG tablet TAKE 1 TABLET BY MOUTH DAILY 5/3/2024    lisinopril (ZESTRIL) 20 MG tablet Take 1 tablet (20 mg) by mouth daily 5/3/2024    loperamide (IMODIUM A-D) 2 MG tablet Take 1-2 tablets (2-4 mg) by mouth 3 times daily as needed for diarrhea     metoprolol succinate ER (TOPROL XL) 50 MG 24 hr tablet TAKE 1 TABLET(50 MG) BY MOUTH DAILY 5/3/2024    Multiple Vitamin (MULTI-VITAMINS) TABS Take 1 tablet by mouth 5/3/2024    nystatin-triamcinolone (MYCOLOG II) 281827-2.1 UNIT/GM-% external cream Apply topically 2 times daily as needed (skin irritation) Unknown    ondansetron (ZOFRAN ODT) 4 MG ODT tab Take 1 tablet (4 mg) by mouth every 8 hours as needed for nausea or vomiting     tadalafil (CIALIS) 5 MG tablet  Take 1 tablet every day by oral route.* 5/2/2024    Turmeric 500 MG TABS Take by mouth daily  5/3/2024    vitamin C (ASCORBIC ACID) 1000 MG TABS Take 1 tablet by mouth daily 5/3/2024    Zinc Acetate, Oral, (GALZIN) 50 MG CAPS Take 50 mg by mouth daily 5/3/2024        Fall with Harm Risk

## 2025-01-14 NOTE — DISCHARGE NOTE ADULT - DEVELOP COPING SKILLS. FOR EXAMPLE, STRATEGIES AND LIFESTYLE CHANGES THAT REDUCE NEGATIVE MOODS, STRESS, AND EXPOSURE TO SMOKING CUES
Pt rcv'd semi-sidelying on stretcher, +hover mat, +PIV, MOC present, Ok to be seen for PT Eval as per RN. Returned as found, in NAD.
Statement Selected